# Patient Record
Sex: FEMALE | Race: WHITE | NOT HISPANIC OR LATINO | Employment: FULL TIME | URBAN - METROPOLITAN AREA
[De-identification: names, ages, dates, MRNs, and addresses within clinical notes are randomized per-mention and may not be internally consistent; named-entity substitution may affect disease eponyms.]

---

## 2018-09-17 ENCOUNTER — TELEPHONE (OUTPATIENT)
Dept: FAMILY MEDICINE | Facility: CLINIC | Age: 43
End: 2018-09-17

## 2018-09-17 NOTE — TELEPHONE ENCOUNTER
lmm for pt to call and reschedule missed appt.  Should be Pe when rescheduled.  Do you want me to send no show policy?

## 2018-11-05 ENCOUNTER — TRANSCRIBE ORDERS (OUTPATIENT)
Dept: SCHEDULING | Age: 43
End: 2018-11-05

## 2018-11-05 DIAGNOSIS — R10.2 PELVIC AND PERINEAL PAIN: Primary | ICD-10-CM

## 2018-12-10 ENCOUNTER — TRANSCRIBE ORDERS (OUTPATIENT)
Dept: REGISTRATION | Age: 43
End: 2018-12-10

## 2018-12-10 ENCOUNTER — HOSPITAL ENCOUNTER (OUTPATIENT)
Dept: RADIOLOGY | Age: 43
Discharge: HOME | End: 2018-12-10
Attending: OBSTETRICS & GYNECOLOGY
Payer: COMMERCIAL

## 2018-12-10 DIAGNOSIS — R10.2 PELVIC AND PERINEAL PAIN: ICD-10-CM

## 2018-12-10 PROCEDURE — 76830 TRANSVAGINAL US NON-OB: CPT

## 2019-01-09 ENCOUNTER — OFFICE VISIT (OUTPATIENT)
Dept: FAMILY MEDICINE | Facility: CLINIC | Age: 44
End: 2019-01-09
Payer: COMMERCIAL

## 2019-01-09 VITALS
RESPIRATION RATE: 16 BRPM | HEART RATE: 76 BPM | HEIGHT: 65 IN | SYSTOLIC BLOOD PRESSURE: 108 MMHG | OXYGEN SATURATION: 97 % | WEIGHT: 141 LBS | DIASTOLIC BLOOD PRESSURE: 70 MMHG | BODY MASS INDEX: 23.49 KG/M2

## 2019-01-09 DIAGNOSIS — F07.81 POST CONCUSSION SYNDROME: ICD-10-CM

## 2019-01-09 DIAGNOSIS — Z00.00 WELL WOMAN EXAM WITHOUT GYNECOLOGICAL EXAM: Primary | ICD-10-CM

## 2019-01-09 DIAGNOSIS — E55.9 VITAMIN D DEFICIENCY: ICD-10-CM

## 2019-01-09 PROCEDURE — 99396 PREV VISIT EST AGE 40-64: CPT | Performed by: FAMILY MEDICINE

## 2019-01-09 RX ORDER — CHLORHEXIDINE GLUCONATE ORAL RINSE 1.2 MG/ML
SOLUTION DENTAL
COMMUNITY
Start: 2018-12-23 | End: 2020-06-18

## 2019-01-09 ASSESSMENT — ENCOUNTER SYMPTOMS
FEVER: 0
SLEEP DISTURBANCE: 0
FREQUENCY: 0
UNEXPECTED WEIGHT CHANGE: 0
CONSTIPATION: 0
STRIDOR: 0
FATIGUE: 0
LIGHT-HEADEDNESS: 0
WEAKNESS: 0
SHORTNESS OF BREATH: 0
SEIZURES: 0
ABDOMINAL PAIN: 0
NAUSEA: 0
VOICE CHANGE: 0
WHEEZING: 0
HEADACHES: 1
DYSURIA: 0
CHEST TIGHTNESS: 0
BLOOD IN STOOL: 0
COUGH: 0
ARTHRALGIAS: 0
TREMORS: 0
DIARRHEA: 0
SORE THROAT: 0
PALPITATIONS: 0
DIZZINESS: 0
BACK PAIN: 0
BRUISES/BLEEDS EASILY: 0
CHILLS: 0

## 2019-01-09 NOTE — PATIENT INSTRUCTIONS
Routine advice given on diet/exercise/weight  Recommend monthly self breast exams    Check CT head re residual findings from previous injury

## 2019-01-09 NOTE — PROGRESS NOTES
Subjective      Patient ID: lEise Bean is a 43 y.o. female.    HPI    Generally well  Concerns : concussion 5 years ago - feels dizzy with change of position                            No cognitive difficulty  Following diet - walking for exercise  Recent PAP/ mammo normal    The following have been reviewed and updated as appropriate in this visit:  Problems       Review of Systems   Constitutional: Negative for chills, fatigue, fever and unexpected weight change.   HENT: Negative for ear pain, hearing loss, sore throat, tinnitus and voice change.    Eyes: Positive for visual disturbance (eye exam upcoming).   Respiratory: Negative for cough, chest tightness, shortness of breath, wheezing and stridor.    Cardiovascular: Negative for chest pain, palpitations and leg swelling.   Gastrointestinal: Negative for abdominal pain, blood in stool, constipation, diarrhea and nausea.   Endocrine: Negative for cold intolerance and heat intolerance.   Genitourinary: Negative for dysuria, frequency, pelvic pain, vaginal bleeding and vaginal discharge.   Musculoskeletal: Negative for arthralgias, back pain and gait problem.   Neurological: Positive for headaches. Negative for dizziness, tremors, seizures, syncope, weakness and light-headedness.   Hematological: Does not bruise/bleed easily.   Psychiatric/Behavioral: Negative for sleep disturbance.       Current Outpatient Prescriptions   Medication Sig Dispense Refill   • chlorhexidine (PERIDEX) 0.12 % solution      • cholecalciferol, vitamin D3, (VITAMIN D3) 4,000 unit capsule Take 1 capsule daily w/ largest meal of the day.       No current facility-administered medications for this visit.      No past medical history on file.  Family History   Problem Relation Age of Onset   • Hypertension Mother    • Hypertension Father    • Heart disease Father    • Ovarian cancer Sister    • Ovarian cancer Mother's Sister      Past Surgical History:   Procedure Laterality Date   •  " SECTION       Allergies   Allergen Reactions   • No Known Drug Allergies      Social History     Social History   • Marital status:      Spouse name: N/A   • Number of children: N/A   • Years of education: N/A     Occupational History   • Not on file.     Social History Main Topics   • Smoking status: Former Smoker   • Smokeless tobacco: Never Used   • Alcohol use Not on file   • Drug use: Unknown   • Sexual activity: Not on file     Other Topics Concern   • Not on file     Social History Narrative   • No narrative on file     Vitals:    19 0943 19 1000   BP: 110/60 108/70   BP Location:  Left upper arm   Patient Position:  Sitting   Pulse: 76    Resp: 16    SpO2: 97%    Weight: 64 kg (141 lb)    Height: 1.638 m (5' 4.5\")        Objective     Physical Exam   Constitutional: She is oriented to person, place, and time. She appears well-developed and well-nourished.   HENT:   Head: Normocephalic.   Right Ear: Tympanic membrane normal.   Left Ear: Tympanic membrane normal.   Nose: Nose normal.   Mouth/Throat: Uvula is midline and oropharynx is clear and moist.   Eyes: EOM and lids are normal. Pupils are equal, round, and reactive to light. Lids are everted and swept, no foreign bodies found.   Fundoscopic exam:       The right eye shows no AV nicking.        The left eye shows no AV nicking.   Neck: Carotid bruit is not present. No thyromegaly present.   Cardiovascular: Normal rate, regular rhythm, normal heart sounds, intact distal pulses and normal pulses.    No murmur heard.  Pulmonary/Chest: Effort normal and breath sounds normal.   Abdominal: Soft. Normal appearance, normal aorta and bowel sounds are normal. She exhibits no abdominal bruit. There is no tenderness. There is no CVA tenderness. Hernia confirmed negative in the right inguinal area and confirmed negative in the left inguinal area.   Genitourinary: Vagina normal. Right adnexum displays no mass. Left adnexum displays no mass. "   Lymphadenopathy:     She has no cervical adenopathy.     She has no axillary adenopathy.   Neurological: She is alert and oriented to person, place, and time.   Skin: Skin is warm. No rash noted.   Psychiatric: She has a normal mood and affect. Her speech is normal and behavior is normal. Cognition and memory are normal.       Assessment/Plan   Problem List Items Addressed This Visit     Vitamin D deficiency     Check labs re dosing - benefits vitamin D discussed  Recent labs reviewed and discussed with pt          Relevant Orders    Vitamin D 25 hydroxy      Other Visit Diagnoses     Well woman exam without gynecological exam    -  Primary    Relevant Orders    CBC and Differential    Comprehensive metabolic panel    Lipid panel    TSH 3rd Generation    Post concussion syndrome        Relevant Orders    CT HEAD WITHOUT IV CONTRAST          Elisha Marte-Tawanna, DO  1/10/2019

## 2019-01-10 ENCOUNTER — TELEPHONE (OUTPATIENT)
Dept: FAMILY MEDICINE | Facility: CLINIC | Age: 44
End: 2019-01-10

## 2019-01-19 LAB
25(OH)D3 SERPL-MCNC: 25 NG/ML (ref 30–100)
ALBUMIN SERPL-MCNC: 4.5 G/DL (ref 3.6–5.1)
ALBUMIN/GLOB SERPL: 2 (CALC) (ref 1–2.5)
ALP SERPL-CCNC: 28 U/L (ref 33–115)
ALT SERPL-CCNC: 11 U/L (ref 6–29)
AST SERPL-CCNC: 16 U/L (ref 10–30)
BASOPHILS # BLD AUTO: 51 CELLS/UL (ref 0–200)
BASOPHILS NFR BLD AUTO: 1.3 %
BILIRUB SERPL-MCNC: 0.8 MG/DL (ref 0.2–1.2)
BUN SERPL-MCNC: 11 MG/DL (ref 7–25)
BUN/CREAT SERPL: ABNORMAL (CALC) (ref 6–22)
CALCIUM SERPL-MCNC: 9.4 MG/DL (ref 8.6–10.2)
CHLORIDE SERPL-SCNC: 105 MMOL/L (ref 98–110)
CHOLEST SERPL-MCNC: 219 MG/DL
CHOLEST/HDLC SERPL: 2 (CALC)
CO2 SERPL-SCNC: 29 MMOL/L (ref 20–32)
CREAT SERPL-MCNC: 0.59 MG/DL (ref 0.5–1.1)
EOSINOPHIL # BLD AUTO: 101 CELLS/UL (ref 15–500)
EOSINOPHIL NFR BLD AUTO: 2.6 %
ERYTHROCYTE [DISTWIDTH] IN BLOOD BY AUTOMATED COUNT: 12.3 % (ref 11–15)
GFR SERPL CREATININE-BSD FRML MDRD: 112 ML/MIN/1.73M2
GLOBULIN SER CALC-MCNC: 2.3 G/DL (CALC) (ref 1.9–3.7)
GLUCOSE SERPL-MCNC: 92 MG/DL (ref 65–99)
HCT VFR BLD AUTO: 37.6 % (ref 35–45)
HDLC SERPL-MCNC: 111 MG/DL
HGB BLD-MCNC: 12.9 G/DL (ref 11.7–15.5)
LDLC SERPL CALC-MCNC: 95 MG/DL (CALC)
LYMPHOCYTES # BLD AUTO: 1868 CELLS/UL (ref 850–3900)
LYMPHOCYTES NFR BLD AUTO: 47.9 %
MCH RBC QN AUTO: 30.9 PG (ref 27–33)
MCHC RBC AUTO-ENTMCNC: 34.3 G/DL (ref 32–36)
MCV RBC AUTO: 90.2 FL (ref 80–100)
MONOCYTES # BLD AUTO: 308 CELLS/UL (ref 200–950)
MONOCYTES NFR BLD AUTO: 7.9 %
NEUTROPHILS # BLD AUTO: 1572 CELLS/UL (ref 1500–7800)
NEUTROPHILS NFR BLD AUTO: 40.3 %
NONHDLC SERPL-MCNC: 108 MG/DL (CALC)
PLATELET # BLD AUTO: 219 THOUSAND/UL (ref 140–400)
PMV BLD REES-ECKER: 11.7 FL (ref 7.5–12.5)
POTASSIUM SERPL-SCNC: 4.6 MMOL/L (ref 3.5–5.3)
PROT SERPL-MCNC: 6.8 G/DL (ref 6.1–8.1)
RBC # BLD AUTO: 4.17 MILLION/UL (ref 3.8–5.1)
SODIUM SERPL-SCNC: 141 MMOL/L (ref 135–146)
TRIGL SERPL-MCNC: 44 MG/DL
TSH SERPL-ACNC: 2.74 MIU/L
WBC # BLD AUTO: 3.9 THOUSAND/UL (ref 3.8–10.8)

## 2019-01-21 ENCOUNTER — TELEPHONE (OUTPATIENT)
Dept: FAMILY MEDICINE | Facility: CLINIC | Age: 44
End: 2019-01-21

## 2019-01-21 NOTE — TELEPHONE ENCOUNTER
----- Message from Elisha Romero DO sent at 1/21/2019  6:08 AM EST -----  Call pt -overall labs GREAT - cbc/blood sugar/kidneys/liver/thyroid all normal - [id number] - goal <200/<150/>50/<100-  Am not worried about tchol with such a good HDL  Only issue is  Vit D low at 25 - ? Taking vit D 4000 daily - IF no needs to take -IF yes increase to 8,000 daily   Will be in touch with CT head results

## 2019-01-23 NOTE — TELEPHONE ENCOUNTER
Pt notified of lab results. States she has not been taking Vit D 4000IU. Advised pt resume Rx daily. Pt ROD. She is scheduled for CT head on 1/25/19.

## 2019-01-25 ENCOUNTER — HOSPITAL ENCOUNTER (OUTPATIENT)
Dept: RADIOLOGY | Age: 44
Discharge: HOME | End: 2019-01-25
Attending: FAMILY MEDICINE
Payer: COMMERCIAL

## 2019-01-25 ENCOUNTER — TELEPHONE (OUTPATIENT)
Dept: FAMILY MEDICINE | Facility: CLINIC | Age: 44
End: 2019-01-25

## 2019-01-25 DIAGNOSIS — F07.81 POST CONCUSSION SYNDROME: ICD-10-CM

## 2019-01-25 PROCEDURE — 70450 CT HEAD/BRAIN W/O DYE: CPT

## 2019-12-04 ENCOUNTER — TRANSCRIBE ORDERS (OUTPATIENT)
Dept: SCHEDULING | Age: 44
End: 2019-12-04

## 2019-12-04 DIAGNOSIS — N93.9 ABNORMAL UTERINE AND VAGINAL BLEEDING, UNSPECIFIED: Primary | ICD-10-CM

## 2019-12-30 ENCOUNTER — HOSPITAL ENCOUNTER (OUTPATIENT)
Dept: RADIOLOGY | Age: 44
Discharge: HOME | End: 2019-12-30
Attending: NURSE PRACTITIONER
Payer: COMMERCIAL

## 2019-12-30 DIAGNOSIS — N93.9 ABNORMAL UTERINE AND VAGINAL BLEEDING, UNSPECIFIED: ICD-10-CM

## 2019-12-30 PROCEDURE — 76856 US EXAM PELVIC COMPLETE: CPT | Mod: 59

## 2020-01-24 ENCOUNTER — OFFICE VISIT (OUTPATIENT)
Dept: FAMILY MEDICINE | Facility: CLINIC | Age: 45
End: 2020-01-24
Payer: COMMERCIAL

## 2020-01-24 VITALS
SYSTOLIC BLOOD PRESSURE: 136 MMHG | HEART RATE: 85 BPM | WEIGHT: 146 LBS | OXYGEN SATURATION: 97 % | TEMPERATURE: 99.7 F | DIASTOLIC BLOOD PRESSURE: 90 MMHG | RESPIRATION RATE: 16 BRPM | BODY MASS INDEX: 24.67 KG/M2

## 2020-01-24 DIAGNOSIS — R06.02 SOB (SHORTNESS OF BREATH): ICD-10-CM

## 2020-01-24 DIAGNOSIS — R09.89 CHOKING IN ADULT: Primary | ICD-10-CM

## 2020-01-24 DIAGNOSIS — Z86.39 HISTORY OF THYROIDITIS: ICD-10-CM

## 2020-01-24 DIAGNOSIS — R09.82 PND (POST-NASAL DRIP): ICD-10-CM

## 2020-01-24 PROCEDURE — 93000 ELECTROCARDIOGRAM COMPLETE: CPT | Performed by: NURSE PRACTITIONER

## 2020-01-24 PROCEDURE — 99214 OFFICE O/P EST MOD 30 MIN: CPT | Performed by: NURSE PRACTITIONER

## 2020-01-24 ASSESSMENT — ENCOUNTER SYMPTOMS
UNEXPECTED WEIGHT CHANGE: 0
DIFFICULTY URINATING: 0
VOICE CHANGE: 0
COUGH: 0
SORE THROAT: 0
FATIGUE: 0
PALPITATIONS: 0
FEVER: 0
HEADACHES: 0
CONFUSION: 0
ACTIVITY CHANGE: 0
ABDOMINAL PAIN: 0
WEAKNESS: 0
SPEECH DIFFICULTY: 0
SHORTNESS OF BREATH: 0
ARTHRALGIAS: 0
APPETITE CHANGE: 0
NUMBNESS: 0
TROUBLE SWALLOWING: 0
CHEST TIGHTNESS: 0

## 2020-01-24 NOTE — PROGRESS NOTES
"Subjective      Patient ID: Elise Bean is a 44 y.o. female.  1975      Pt reports one episode of \"choking\" about 6 days ago.  Pt was getting ready for work and felt like she couldn't get air.  Went downstairs to her  and they called EMS.  EMS checked her HR and listened to her lungs and said she was okay.  Pt reports feeling like her throat was sore for about 2 days after the incident.  Pt reports she had coffee and omelet with tomatoes before the episode occurred.  Reports ongoing feeling of post nasal drip in back of throat.  Reports this has never happened before and hasn't happened since this episode.  Reports history of thyroiditis (without explanation) about 6 years ago.    Pt made appt with cardiologist because she is worried something is wrong with her heart, which is in about 3 weeks.  Denies CP, SOB, trouble swallowing, and history of panic attacks.  Denies acid reflux history and heartburn.  Denies fevers/chills.        The following have been reviewed and updated as appropriate in this visit:  Tobacco  Allergies  Meds  Problems  Med Hx  Surg Hx  Fam Hx  Soc Hx        Review of Systems   Constitutional: Negative for activity change, appetite change, fatigue, fever and unexpected weight change.   HENT: Positive for postnasal drip (ongoing). Negative for congestion, sore throat, trouble swallowing and voice change.         One episode of \"choking\" 6 days ago.  Hasn't recurred since.   Eyes: Negative for visual disturbance.   Respiratory: Negative for cough, chest tightness and shortness of breath.    Cardiovascular: Negative for chest pain, palpitations and leg swelling.   Gastrointestinal: Negative for abdominal pain.   Genitourinary: Negative for difficulty urinating.   Musculoskeletal: Negative for arthralgias.   Skin: Negative for rash.   Neurological: Negative for speech difficulty, weakness, numbness and headaches.   Psychiatric/Behavioral: Negative for confusion. "       Objective     Vitals:    01/24/20 0813   BP: 136/90   Pulse: 85   Resp: 16   Temp: 37.6 °C (99.7 °F)   SpO2: 97%   Weight: 66.2 kg (146 lb)     Body mass index is 24.67 kg/m².    Physical Exam   Constitutional: She is oriented to person, place, and time. She appears well-developed and well-nourished. No distress.   HENT:   Head: Normocephalic and atraumatic.   Right Ear: External ear normal.   Left Ear: External ear normal.   Nose: Nose normal.   Mouth/Throat: Oropharynx is clear and moist.   Eyes: Conjunctivae are normal. Right eye exhibits no discharge. Left eye exhibits no discharge. No scleral icterus.   Neck: Normal range of motion and full passive range of motion without pain. No thyromegaly present.   Cardiovascular: Normal rate, regular rhythm and normal heart sounds.   Pulmonary/Chest: Effort normal and breath sounds normal.   Musculoskeletal: Normal range of motion. She exhibits no edema, tenderness or deformity.   Lymphadenopathy:     She has no cervical adenopathy.   Neurological: She is alert and oriented to person, place, and time. No cranial nerve deficit. Coordination normal.   Skin: Skin is warm and dry. No rash noted.   Psychiatric: She has a normal mood and affect. Her behavior is normal. Judgment and thought content normal.   Nursing note and vitals reviewed.      Assessment/Plan   Diagnoses and all orders for this visit:    Choking in adult (Primary)  Suspect r/t GERD (since it occurred after acidic breakfast) or PND.    No red flags on exam or in history.     SOB (shortness of breath)  -     ECG 12 LEAD OFFICE PERFORMED  WNL.  Reassured pt this does not seem to be cardiac etiology.    PND (post-nasal drip)  Advised Nasocort 2 puffs in each nostril once/day.    History of thyroiditis  -     ULTRASOUND THYROID; Future  -     TSH; Future  -     T4, free; Future  -     T3; Future  Thyroid felt WNL on exam, but will check workup d/t history of thyroiditis and choking episode.    Follow up  PRN.

## 2020-01-25 LAB
T3 SERPL-MCNC: 87 NG/DL (ref 76–181)
T4 FREE SERPL-MCNC: 1.1 NG/DL (ref 0.8–1.8)
TSH SERPL-ACNC: 2.06 MIU/L

## 2020-01-27 ENCOUNTER — TELEPHONE (OUTPATIENT)
Dept: FAMILY MEDICINE | Facility: CLINIC | Age: 45
End: 2020-01-27

## 2020-03-02 ENCOUNTER — OFFICE VISIT (OUTPATIENT)
Dept: FAMILY MEDICINE | Facility: CLINIC | Age: 45
End: 2020-03-02
Payer: COMMERCIAL

## 2020-03-02 VITALS
WEIGHT: 146 LBS | SYSTOLIC BLOOD PRESSURE: 104 MMHG | HEART RATE: 98 BPM | BODY MASS INDEX: 24.92 KG/M2 | RESPIRATION RATE: 16 BRPM | DIASTOLIC BLOOD PRESSURE: 79 MMHG | OXYGEN SATURATION: 98 % | TEMPERATURE: 98.2 F | HEIGHT: 64 IN

## 2020-03-02 DIAGNOSIS — I87.2 VENOUS INSUFFICIENCY OF BOTH LOWER EXTREMITIES: ICD-10-CM

## 2020-03-02 DIAGNOSIS — Z00.00 WELL WOMAN EXAM WITHOUT GYNECOLOGICAL EXAM: Primary | ICD-10-CM

## 2020-03-02 DIAGNOSIS — E55.9 VITAMIN D DEFICIENCY: ICD-10-CM

## 2020-03-02 PROCEDURE — 99396 PREV VISIT EST AGE 40-64: CPT | Performed by: FAMILY MEDICINE

## 2020-03-02 SDOH — HEALTH STABILITY: MENTAL HEALTH: HOW OFTEN DO YOU HAVE A DRINK CONTAINING ALCOHOL?: NEVER

## 2020-03-02 ASSESSMENT — ENCOUNTER SYMPTOMS
ARTHRALGIAS: 0
ABDOMINAL PAIN: 0
VOICE CHANGE: 0
SEIZURES: 0
WHEEZING: 0
DIARRHEA: 0
BRUISES/BLEEDS EASILY: 0
CHILLS: 0
DYSURIA: 0
COUGH: 0
LIGHT-HEADEDNESS: 0
UNEXPECTED WEIGHT CHANGE: 0
BLOOD IN STOOL: 0
FATIGUE: 0
SHORTNESS OF BREATH: 0
PALPITATIONS: 1
WEAKNESS: 0
BACK PAIN: 0
SLEEP DISTURBANCE: 0
STRIDOR: 0
HEADACHES: 0
DIZZINESS: 0
CONSTIPATION: 0
CHEST TIGHTNESS: 0
NAUSEA: 0
TREMORS: 0
FREQUENCY: 0
SORE THROAT: 0
FEVER: 0

## 2020-03-02 NOTE — PROGRESS NOTES
Subjective      Patient ID: Elise Bean is a 44 y.o. female.    HPI     Generally well  Following diet - plans more exercise  NO recurrent choking episode    The following have been reviewed and updated as appropriate in this visit:       Review of Systems   Constitutional: Negative for chills, fatigue, fever and unexpected weight change.   HENT: Negative for ear pain, hearing loss, sore throat, tinnitus and voice change.    Eyes: Positive for visual disturbance (less clear vision - eye exam overdue).   Respiratory: Negative for cough, chest tightness, shortness of breath, wheezing and stridor.    Cardiovascular: Positive for palpitations. Negative for chest pain and leg swelling.   Gastrointestinal: Negative for abdominal pain, blood in stool, constipation, diarrhea and nausea.   Endocrine: Negative for cold intolerance and heat intolerance.   Genitourinary: Negative for dysuria, frequency, pelvic pain, vaginal bleeding and vaginal discharge.   Musculoskeletal: Negative for arthralgias, back pain and gait problem.   Neurological: Negative for dizziness, tremors, seizures, syncope, weakness, light-headedness and headaches.   Hematological: Does not bruise/bleed easily.   Psychiatric/Behavioral: Negative for sleep disturbance.       Current Outpatient Medications   Medication Sig Dispense Refill   • chlorhexidine (PERIDEX) 0.12 % solution      • cholecalciferol, vitamin D3, (VITAMIN D3) 4,000 unit capsule Take 1 capsule daily w/ largest meal of the day.       No current facility-administered medications for this visit.      History reviewed. No pertinent past medical history.  Family History   Problem Relation Age of Onset   • Hypertension Biological Mother    • Hypertension Biological Father    • Heart disease Biological Father    • Diabetes Biological Father    • Ovarian cancer Biological Sister    • Ovarian cancer Mother's Sister      Past Surgical History:   Procedure Laterality Date   •  SECTION    "    Allergies   Allergen Reactions   • No Known Drug Allergies      Social History     Socioeconomic History   • Marital status:      Spouse name: Not on file   • Number of children: Not on file   • Years of education: Not on file   • Highest education level: Not on file   Occupational History   • Not on file   Social Needs   • Financial resource strain: Not on file   • Food insecurity:     Worry: Not on file     Inability: Not on file   • Transportation needs:     Medical: Not on file     Non-medical: Not on file   Tobacco Use   • Smoking status: Former Smoker   • Smokeless tobacco: Never Used   Substance and Sexual Activity   • Alcohol use: Never     Frequency: Never   • Drug use: Never   • Sexual activity: Yes     Partners: Male   Lifestyle   • Physical activity:     Days per week: Not on file     Minutes per session: Not on file   • Stress: Not on file   Relationships   • Social connections:     Talks on phone: Not on file     Gets together: Not on file     Attends [employer] service: Not on file     Active member of club or organization: Not on file     Attends meetings of clubs or organizations: Not on file     Relationship status: Not on file   • Intimate partner violence:     Fear of current or ex partner: Not on file     Emotionally abused: Not on file     Physically abused: Not on file     Forced sexual activity: Not on file   Other Topics Concern   • Not on file   Social History Narrative   • Not on file     Vitals:    03/02/20 1049 03/02/20 1122   BP: 110/60 104/79   BP Location: Right upper arm    Patient Position: Sitting    Pulse: 98    Resp: 16    Temp: 36.8 °C (98.2 °F)    TempSrc: Temporal    SpO2: 98%    Weight: 66.2 kg (146 lb)    Height: 1.626 m (5' 4\")        Objective     Physical Exam   Constitutional: She is oriented to person, place, and time. She appears well-developed and well-nourished.   HENT:   Head: Normocephalic.   Right Ear: Tympanic membrane normal.   Left Ear: Tympanic " membrane normal.   Nose: Nose normal.   Mouth/Throat: Uvula is midline, oropharynx is clear and moist and mucous membranes are normal. No posterior oropharyngeal erythema.   Eyes: Pupils are equal, round, and reactive to light. EOM and lids are normal. Lids are everted and swept, no foreign bodies found.   Fundoscopic exam:       The right eye shows no AV nicking.        The left eye shows no AV nicking.   Neck: Carotid bruit is not present. No thyromegaly present.   Cardiovascular: Normal rate, regular rhythm, normal heart sounds, intact distal pulses and normal pulses.   No murmur heard.  Mod dilated superficial veins lower extrem bilat   Pulmonary/Chest: Effort normal and breath sounds normal.   Abdominal: Soft. Normal appearance, normal aorta and bowel sounds are normal. She exhibits no abdominal bruit. There is no hepatosplenomegaly. There is no tenderness. There is no CVA tenderness. Hernia confirmed negative in the right inguinal area and confirmed negative in the left inguinal area.   Genitourinary: Vagina normal. Right adnexum displays no mass. Left adnexum displays no mass.   Lymphadenopathy:     She has no cervical adenopathy.     She has no axillary adenopathy.   Neurological: She is alert and oriented to person, place, and time.   Skin: Skin is warm. No rash noted.   Psychiatric: She has a normal mood and affect. Her speech is normal and behavior is normal. Cognition and memory are normal.       Assessment/Plan   Problem List Items Addressed This Visit        Circulatory    Venous insufficiency of both lower extremities     Support stockings            Digestive    Vitamin D deficiency     Check labs re dosing - benefits vitamin D discussed  Recent labs reviewed and discussed with pt          Relevant Orders    Vitamin D 25 hydroxy      Other Visit Diagnoses     Well woman exam without gynecological exam    -  Primary    Relevant Orders    CBC and Differential    Comprehensive metabolic panel    Lipid  panel          Elisha Romero,   3/3/2020

## 2020-03-02 NOTE — PATIENT INSTRUCTIONS
FAX LABS TO CARDIOLOGY    Routine advice given on diet/exercise/weight  Recommend monthly self breast exams  Recommend calcium with D3 - VIT D DAILY

## 2020-03-07 LAB
25(OH)D3 SERPL-MCNC: 26 NG/ML (ref 30–100)
ALBUMIN SERPL-MCNC: 4.1 G/DL (ref 3.6–5.1)
ALBUMIN/GLOB SERPL: 1.7 (CALC) (ref 1–2.5)
ALP SERPL-CCNC: 29 U/L (ref 31–125)
ALT SERPL-CCNC: 6 U/L (ref 6–29)
AST SERPL-CCNC: 12 U/L (ref 10–30)
BASOPHILS # BLD AUTO: 52 CELLS/UL (ref 0–200)
BASOPHILS NFR BLD AUTO: 1.2 %
BILIRUB SERPL-MCNC: 0.9 MG/DL (ref 0.2–1.2)
BUN SERPL-MCNC: 10 MG/DL (ref 7–25)
BUN/CREAT SERPL: ABNORMAL (CALC) (ref 6–22)
CALCIUM SERPL-MCNC: 9.1 MG/DL (ref 8.6–10.2)
CHLORIDE SERPL-SCNC: 103 MMOL/L (ref 98–110)
CHOLEST SERPL-MCNC: 192 MG/DL
CHOLEST/HDLC SERPL: 2.1 (CALC)
CO2 SERPL-SCNC: 28 MMOL/L (ref 20–32)
CREAT SERPL-MCNC: 0.61 MG/DL (ref 0.5–1.1)
EOSINOPHIL # BLD AUTO: 90 CELLS/UL (ref 15–500)
EOSINOPHIL NFR BLD AUTO: 2.1 %
ERYTHROCYTE [DISTWIDTH] IN BLOOD BY AUTOMATED COUNT: 13 % (ref 11–15)
GLOBULIN SER CALC-MCNC: 2.4 G/DL (CALC) (ref 1.9–3.7)
GLUCOSE SERPL-MCNC: 87 MG/DL (ref 65–99)
HCT VFR BLD AUTO: 37.2 % (ref 35–45)
HDLC SERPL-MCNC: 92 MG/DL
HGB BLD-MCNC: 12.7 G/DL (ref 11.7–15.5)
LDLC SERPL CALC-MCNC: 87 MG/DL (CALC)
LYMPHOCYTES # BLD AUTO: 1746 CELLS/UL (ref 850–3900)
LYMPHOCYTES NFR BLD AUTO: 40.6 %
MCH RBC QN AUTO: 30.2 PG (ref 27–33)
MCHC RBC AUTO-ENTMCNC: 34.1 G/DL (ref 32–36)
MCV RBC AUTO: 88.6 FL (ref 80–100)
MONOCYTES # BLD AUTO: 310 CELLS/UL (ref 200–950)
MONOCYTES NFR BLD AUTO: 7.2 %
NEUTROPHILS # BLD AUTO: 2103 CELLS/UL (ref 1500–7800)
NEUTROPHILS NFR BLD AUTO: 48.9 %
NONHDLC SERPL-MCNC: 100 MG/DL (CALC)
PLATELET # BLD AUTO: 213 THOUSAND/UL (ref 140–400)
PMV BLD REES-ECKER: 12.1 FL (ref 7.5–12.5)
POTASSIUM SERPL-SCNC: 4.1 MMOL/L (ref 3.5–5.3)
PROT SERPL-MCNC: 6.5 G/DL (ref 6.1–8.1)
QUEST EGFR NON-AFR. AMERICAN: 110 ML/MIN/1.73M2
RBC # BLD AUTO: 4.2 MILLION/UL (ref 3.8–5.1)
SODIUM SERPL-SCNC: 138 MMOL/L (ref 135–146)
TRIGL SERPL-MCNC: 50 MG/DL
WBC # BLD AUTO: 4.3 THOUSAND/UL (ref 3.8–10.8)

## 2020-03-09 ENCOUNTER — TELEPHONE (OUTPATIENT)
Dept: FAMILY MEDICINE | Facility: CLINIC | Age: 45
End: 2020-03-09

## 2020-03-09 NOTE — TELEPHONE ENCOUNTER
----- Message from Elisha Romero DO sent at 3/9/2020  6:40 AM EDT -----  CALL PT - CBC/BLOOD SUGAR/KIDNEYS/LIVER ALL NORMAL  CHOLESTEROL PANEL EXCELLENT   VITAMIN D LOW AT 26 - GOAL > 40  RECOMMEND VIT D3  4000 UNITS DAILY

## 2020-05-05 ENCOUNTER — TELEMEDICINE (OUTPATIENT)
Dept: FAMILY MEDICINE | Facility: CLINIC | Age: 45
End: 2020-05-05
Payer: COMMERCIAL

## 2020-05-05 DIAGNOSIS — R03.0 BLOOD PRESSURE ELEVATED WITHOUT HISTORY OF HTN: ICD-10-CM

## 2020-05-05 DIAGNOSIS — B00.1 RECURRENT COLD SORES: ICD-10-CM

## 2020-05-05 DIAGNOSIS — I80.02 SUPERFICIAL PHLEBITIS OF LEFT LEG: Primary | ICD-10-CM

## 2020-05-05 PROCEDURE — 99214 OFFICE O/P EST MOD 30 MIN: CPT | Mod: 95 | Performed by: FAMILY MEDICINE

## 2020-05-05 RX ORDER — VALACYCLOVIR HYDROCHLORIDE 500 MG/1
500 TABLET, FILM COATED ORAL 2 TIMES DAILY
Qty: 10 TABLET | Refills: 1 | Status: SHIPPED | OUTPATIENT
Start: 2020-05-05 | End: 2020-06-18

## 2020-05-05 NOTE — PROGRESS NOTES
Verification of Patient Location:  The patient affirms they are currently located in the following state: [place]    Request for Consent:   Video Encounter   Margarito, my name is Elisha TIMUR DO Nick.  Before we proceed, can you please verify your identification by telling me your full name and date of birth?  Can you tell me who is in the room with you?    You and I are about to have a telemedicine check-in or visit because you have requested it.  This is a live video-conference.  I am a real person, speaking to you in real time.  There is no one else with me on the video-conference.  However, when we use ([employer], [employer], etc) it is important for you to know that the video-conference may not be secure or private.  I am not recording this conversation and I am asking you not to record it.  This telemedicine visit will be billed to your health insurance or you, if you are self-insured.  You understand you will be responsible for any copayments or coinsurances that apply to your telemedicine visit.  Before starting our telemedicine visit, I am required to get your consent for this virtual check-in or visit by telemedicine. Do you consent?    Patient Response to Request for Consent:  Yes    This telemedicine visit is being performed due to the national COVID emeegency  COVID counseling given      Visit Documentation:  Subjective     Patient ID: Elise Bean is a 44 y.o. female.  1975      HPI    Pt with multiple complaints  * chest pressure anterior with radiation to left shoulder         Had telemedicine visit with cardiologist this am - echo/holter recommended  * longstanding venous insuff - seen previously vein clinics - vein stripping - still with pain left calf - no swelling   Worried about blood clot   * checking BP at home and elevated as below - eating sig salt in diet  BP  144/83, HR 90         137/82        80          135/91       77    Mother HPT/ chol  Father  HPT./ DM  The  following have been reviewed and updated as appropriate in this visit:       Review of Systems   Constitutional: Negative for chills, fatigue and unexpected weight change.   Eyes: Negative for visual disturbance.   Respiratory: Positive for chest tightness. Negative for cough and shortness of breath.    Cardiovascular: Negative for chest pain, palpitations and leg swelling.   Gastrointestinal: Negative for abdominal pain, blood in stool, constipation, diarrhea and nausea.   Endocrine: Negative for cold intolerance and heat intolerance.   Genitourinary: Negative for dysuria, frequency, hematuria and urgency.   Musculoskeletal: Negative for arthralgias.   Skin: Positive for rash (cold sores lip - recurrent/ not frequent). Negative for color change.   Neurological: Negative for dizziness, numbness and headaches.   Hematological: Negative for adenopathy. Does not bruise/bleed easily.   Psychiatric/Behavioral: Negative for sleep disturbance. The patient is nervous/anxious.        Previous labs discussed - lipids at goal    Assessment/Plan     Diagnoses and all orders for this visit:    Superficial phlebitis of left leg  Labs for hypercoagulability ordered at pt request  Counseled re signs DVT  Counseled likely need additional vein procedure for SX  -     D-DIMER; Future  -     Protein S activity; Future  -     Protein C activity; Future  -     Factor 5 Leiden; Future  -     D-DIMER  -     Protein S activity  -     Protein C activity  -     Factor 5 Leiden    Recurrent cold sores  meds as directed   Blood pressure elevated without history of HTN  Risk factors for hypertension discussed  Strong family HX   Low salt diet - continue to monitor BP   Counseled may need RX  Other orders  -     valACYclovir (VALTREX) 500 mg tablet; Take 1 tablet (500 mg total) by mouth 2 (two) times a day for 5 days.      Time Spent in Medical Discussion During This Encounter:      25 minutes

## 2020-05-06 ASSESSMENT — ENCOUNTER SYMPTOMS
NUMBNESS: 0
CONSTIPATION: 0
UNEXPECTED WEIGHT CHANGE: 0
ARTHRALGIAS: 0
HEMATURIA: 0
ADENOPATHY: 0
SHORTNESS OF BREATH: 0
COUGH: 0
FATIGUE: 0
PALPITATIONS: 0
BRUISES/BLEEDS EASILY: 0
DIZZINESS: 0
NERVOUS/ANXIOUS: 1
ABDOMINAL PAIN: 0
COLOR CHANGE: 0
BLOOD IN STOOL: 0
HEADACHES: 0
FREQUENCY: 0
SLEEP DISTURBANCE: 0
DIARRHEA: 0
CHEST TIGHTNESS: 1
NAUSEA: 0
CHILLS: 0
DYSURIA: 0

## 2020-05-06 NOTE — PATIENT INSTRUCTIONS
CHECK LABS RE VEIN INFLAMMATION   COUNSELED LIKELY NEED ADDITIONAL PROCEDURE DUE TO CALF   LIMIT SALT  CLOSE FOLLOW BP AT HOME

## 2020-05-08 ENCOUNTER — TELEMEDICINE (OUTPATIENT)
Dept: FAMILY MEDICINE | Facility: CLINIC | Age: 45
End: 2020-05-08
Payer: COMMERCIAL

## 2020-05-08 DIAGNOSIS — F41.1 GAD (GENERALIZED ANXIETY DISORDER): ICD-10-CM

## 2020-05-08 DIAGNOSIS — R00.2 PALPITATIONS: Primary | ICD-10-CM

## 2020-05-08 PROCEDURE — 99213 OFFICE O/P EST LOW 20 MIN: CPT | Mod: 95 | Performed by: FAMILY MEDICINE

## 2020-05-08 RX ORDER — ALPRAZOLAM 0.25 MG/1
0.25 TABLET ORAL NIGHTLY PRN
Qty: 10 TABLET | Refills: 0 | Status: SHIPPED | OUTPATIENT
Start: 2020-05-08 | End: 2020-06-18

## 2020-05-08 ASSESSMENT — ENCOUNTER SYMPTOMS
COUGH: 0
FEVER: 0
PALPITATIONS: 1
NERVOUS/ANXIOUS: 1
CHILLS: 0
DIZZINESS: 0
FATIGUE: 0
SLEEP DISTURBANCE: 1
HEADACHES: 1
SHORTNESS OF BREATH: 0

## 2020-05-08 NOTE — PROGRESS NOTES
Verification of Patient Location:  The patient affirms they are currently located in the following state: [place]    Request for Consent:   Video Encounter   Margarito, my name is Elisha DING DO Nick.  Before we proceed, can you please verify your identification by telling me your full name and date of birth?  Can you tell me who is in the room with you?    You and I are about to have a telemedicine check-in or visit because you have requested it.  This is a live video-conference.  I am a real person, speaking to you in real time.  There is no one else with me on the video-conference.  However, when we use ([employer], [employer], etc) it is important for you to know that the video-conference may not be secure or private.  I am not recording this conversation and I am asking you not to record it.  This telemedicine visit will be billed to your health insurance or you, if you are self-insured.  You understand you will be responsible for any copayments or coinsurances that apply to your telemedicine visit.  Before starting our telemedicine visit, I am required to get your consent for this virtual check-in or visit by telemedicine. Do you consent?    Patient Response to Request for Consent:  Yes     This telemeicine visit is being performed due to the national COVID emergency per CDC guidelines    Visit Documentation:  Subjective     Patient ID: Elise Bean is a 44 y.o. female.  1975      HPI    Pt c/o difficulty sleeping since palpitations began   pt seen in ER with sx- afraid something wrong with heart   work up in progress including echo/holter/ hypercoagulability  Asking for help with sleep difficulty    /83   HR 90    The following have been reviewed and updated as appropriate in this visit:       Review of Systems   Constitutional: Negative for chills, fatigue and fever.   Eyes: Negative for visual disturbance.   Respiratory: Negative for cough and shortness of breath.    Cardiovascular:  Positive for palpitations. Negative for chest pain and leg swelling.   Neurological: Positive for headaches (neck to top of head). Negative for dizziness.   Psychiatric/Behavioral: Positive for sleep disturbance. The patient is nervous/anxious.      ALL LABS/ TESTING [place] [facility] REVIEWED AND DISCUSSED WITH PT    Assessment/Plan   Diagnoses and all orders for this visit:    Palpitations  Comments:  echo/holter as planned    MICHELLE (generalized anxiety disorder)  Comments:  counseled re effect of stress on BP / HR/ sleep  rec natural RX but use of prescription if needed    PMPAWARxE website queried no abnormalities noted.  Patient is only receiving controlled substances from my office  Prescription sent electronically      Time Spent in Medical Discussion During This Encounter:      15 minutes

## 2020-05-17 LAB
D DIMER PPP FEU-MCNC: 0.24 MCG/ML FEU
F5 GENE MUT ANL BLD/T: NORMAL
F5 P.R506Q BLD/T QL: NORMAL
PROT C ACT/NOR PPP: 100 % (ref 70–180)
PROT S ACT/NOR PPP: 70 % (ref 60–140)

## 2020-05-28 ENCOUNTER — TELEPHONE (OUTPATIENT)
Dept: FAMILY MEDICINE | Facility: CLINIC | Age: 45
End: 2020-05-28

## 2020-06-01 RX ORDER — CITALOPRAM 10 MG/1
10 TABLET ORAL DAILY
Qty: 30 TABLET | Refills: 1 | Status: SHIPPED | OUTPATIENT
Start: 2020-06-01 | End: 2020-06-18

## 2020-06-01 NOTE — TELEPHONE ENCOUNTER
Discussed labs with pt   Hetero Factor V leiden - start ASA gr 81  Pt continues with anxiety     Father passed away -no sleeping   Start SSRI

## 2020-06-18 ENCOUNTER — OFFICE VISIT (OUTPATIENT)
Dept: FAMILY MEDICINE | Facility: CLINIC | Age: 45
End: 2020-06-18
Payer: COMMERCIAL

## 2020-06-18 VITALS
RESPIRATION RATE: 16 BRPM | DIASTOLIC BLOOD PRESSURE: 70 MMHG | TEMPERATURE: 97.6 F | BODY MASS INDEX: 23.56 KG/M2 | OXYGEN SATURATION: 97 % | HEIGHT: 64 IN | SYSTOLIC BLOOD PRESSURE: 122 MMHG | HEART RATE: 96 BPM | WEIGHT: 138 LBS

## 2020-06-18 DIAGNOSIS — F51.01 PRIMARY INSOMNIA: Primary | ICD-10-CM

## 2020-06-18 DIAGNOSIS — F41.1 GAD (GENERALIZED ANXIETY DISORDER): ICD-10-CM

## 2020-06-18 PROCEDURE — 99213 OFFICE O/P EST LOW 20 MIN: CPT | Performed by: FAMILY MEDICINE

## 2020-06-18 RX ORDER — SERTRALINE HYDROCHLORIDE 25 MG/1
25 TABLET, FILM COATED ORAL DAILY
Qty: 30 TABLET | Refills: 3 | Status: SHIPPED | OUTPATIENT
Start: 2020-06-18 | End: 2021-03-08

## 2020-06-18 NOTE — PROGRESS NOTES
Subjective      Patient ID: Elise Bean is a 44 y.o. female.    HPI     Heart racing - very anxious - NOT sleeping x months   seen by cardiology - DID NOT have holter as ordered  possible RXN citalopram - nausea / funny thoughts -    Only took 1 dose  Tried OTC aid - benadryl last PM - did sleep several hours      The following have been reviewed and updated as appropriate in this visit:       Review of Systems   Constitutional: Positive for fatigue. Negative for chills, diaphoresis and fever.   Respiratory: Negative for shortness of breath.    Cardiovascular: Positive for palpitations. Negative for chest pain and leg swelling.   Psychiatric/Behavioral: Positive for dysphoric mood and sleep disturbance. The patient is nervous/anxious.        Current Outpatient Medications   Medication Sig Dispense Refill   • cholecalciferol, vitamin D3, (VITAMIN D3) 4,000 unit capsule Take 1 capsule daily w/ largest meal of the day.     • sertraline (ZOLOFT) 25 mg tablet Take 1 tablet (25 mg total) by mouth daily. 30 tablet 3     No current facility-administered medications for this visit.      No past medical history on file.  Family History   Problem Relation Age of Onset   • Hypertension Biological Mother    • Hypertension Biological Father    • Heart disease Biological Father    • Diabetes Biological Father    • Ovarian cancer Biological Sister    • Ovarian cancer Mother's Sister      Past Surgical History:   Procedure Laterality Date   •  SECTION       Allergies   Allergen Reactions   • No Known Drug Allergies      Social History     Socioeconomic History   • Marital status:      Spouse name: Not on file   • Number of children: Not on file   • Years of education: Not on file   • Highest education level: Not on file   Occupational History   • Not on file   Social Needs   • Financial resource strain: Not on file   • Food insecurity:     Worry: Not on file     Inability: Not on file   • Transportation needs:      "Medical: Not on file     Non-medical: Not on file   Tobacco Use   • Smoking status: Former Smoker   • Smokeless tobacco: Never Used   Substance and Sexual Activity   • Alcohol use: Never     Frequency: Never   • Drug use: Never   • Sexual activity: Yes     Partners: Male   Lifestyle   • Physical activity:     Days per week: Not on file     Minutes per session: Not on file   • Stress: Not on file   Relationships   • Social connections:     Talks on phone: Not on file     Gets together: Not on file     Attends Scientology service: Not on file     Active member of club or organization: Not on file     Attends meetings of clubs or organizations: Not on file     Relationship status: Not on file   • Intimate partner violence:     Fear of current or ex partner: Not on file     Emotionally abused: Not on file     Physically abused: Not on file     Forced sexual activity: Not on file   Other Topics Concern   • Not on file   Social History Narrative   • Not on file     Vitals:    06/18/20 0814 06/18/20 0828   BP: 122/70 122/70   BP Location:  Left upper arm   Patient Position:  Sitting   Pulse: 96    Resp: 16    Temp: 36.4 °C (97.6 °F)    SpO2: 97%    Weight: 62.6 kg (138 lb)    Height: 1.626 m (5' 4\")        Objective     Physical Exam   Constitutional: She appears well-developed and well-nourished. No distress.   Cardiovascular: Normal rate, regular rhythm, intact distal pulses and normal pulses.   Pulmonary/Chest: Effort normal and breath sounds normal.   Abdominal: Soft. Normal appearance and bowel sounds are normal. There is no hepatosplenomegaly. There is no tenderness.   Psychiatric: Her speech is normal and behavior is normal. Her mood appears anxious. Cognition and memory are normal.       Assessment/Plan   Problem List Items Addressed This Visit        Other    Primary insomnia - Primary    MICHELLE (generalized anxiety disorder)     Counseled re chemical imbalance associated with anxiety  meds as directed  Possible side " effects discussed            Relevant Medications    sertraline (ZOLOFT) 25 mg tablet          Elisha Romero DO  6/20/2020

## 2020-06-20 PROBLEM — F51.01 PRIMARY INSOMNIA: Status: ACTIVE | Noted: 2020-06-20

## 2020-06-20 PROBLEM — F41.1 GAD (GENERALIZED ANXIETY DISORDER): Status: ACTIVE | Noted: 2020-06-20

## 2020-06-20 ASSESSMENT — ENCOUNTER SYMPTOMS
DIAPHORESIS: 0
NERVOUS/ANXIOUS: 1
FEVER: 0
DYSPHORIC MOOD: 1
CHILLS: 0
PALPITATIONS: 1
SHORTNESS OF BREATH: 0
SLEEP DISTURBANCE: 1
FATIGUE: 1

## 2020-06-20 NOTE — ASSESSMENT & PLAN NOTE
Counseled re chemical imbalance associated with anxiety  meds as directed  Possible side effects discussed

## 2020-08-10 ENCOUNTER — TELEPHONE (OUTPATIENT)
Dept: FAMILY MEDICINE | Facility: CLINIC | Age: 45
End: 2020-08-10

## 2020-08-10 NOTE — TELEPHONE ENCOUNTER
8/10/20 Pt called in regards to a lab that her insurance didn't cover. She said she is fully covered. She spoke to billing and they said the code was put in wrong. She said the test Coag Fact V ERNESTO BARNES 81.520 she asked for a call back when this has been resolved. [phone] Thanks

## 2020-12-14 ENCOUNTER — TELEPHONE (OUTPATIENT)
Dept: FAMILY MEDICINE | Facility: CLINIC | Age: 45
End: 2020-12-14

## 2020-12-14 PROBLEM — D68.51 HETEROZYGOUS FACTOR V LEIDEN MUTATION (CMS/HCC): Status: ACTIVE | Noted: 2020-12-14

## 2021-01-07 ENCOUNTER — TELEPHONE (OUTPATIENT)
Dept: FAMILY MEDICINE | Facility: CLINIC | Age: 46
End: 2021-01-07

## 2021-01-07 NOTE — TELEPHONE ENCOUNTER
PLEASE CALL PT AND LET HER KNOW WHAT WE HAVE BEEN DOING RE RESUBMITTING CORRECT CODE / STILL DENYING  I CAN WRITE A LETTER

## 2021-01-07 NOTE — TELEPHONE ENCOUNTER
Pt called in regards to a lab that her insurance didn't cover on 05/11. She said she is fully covered. She spoke to billing and they said the code was put in wrong. She said she dropped the bill off twice and nothing was resolved. She was told that you would resubmit with new code. She said that she is now getting letters in the mail that she needs to pay her balance immediately .

## 2021-01-22 ENCOUNTER — OFFICE VISIT (OUTPATIENT)
Dept: FAMILY MEDICINE | Facility: CLINIC | Age: 46
End: 2021-01-22
Payer: COMMERCIAL

## 2021-01-22 VITALS
WEIGHT: 141 LBS | DIASTOLIC BLOOD PRESSURE: 74 MMHG | HEIGHT: 64 IN | SYSTOLIC BLOOD PRESSURE: 114 MMHG | OXYGEN SATURATION: 99 % | HEART RATE: 80 BPM | BODY MASS INDEX: 24.07 KG/M2 | TEMPERATURE: 97.4 F

## 2021-01-22 DIAGNOSIS — J38.3 PARADOXICAL VOCAL CORD MOTION: Primary | ICD-10-CM

## 2021-01-22 PROCEDURE — 99213 OFFICE O/P EST LOW 20 MIN: CPT | Performed by: FAMILY MEDICINE

## 2021-01-22 ASSESSMENT — ENCOUNTER SYMPTOMS
ABDOMINAL PAIN: 0
CONFUSION: 0
NAUSEA: 0
FEVER: 0
CHILLS: 0
JOINT SWELLING: 0
DYSURIA: 0
WOUND: 0
VOMITING: 0
SHORTNESS OF BREATH: 0
EYE DISCHARGE: 0
NUMBNESS: 0
WEAKNESS: 0
HALLUCINATIONS: 0
HEMATURIA: 0
COUGH: 0
RHINORRHEA: 0

## 2021-01-22 NOTE — TELEPHONE ENCOUNTER
Patient would like you to write a letter to try to get the Factor V covered.  Please let her know the status.     Thank you

## 2021-01-22 NOTE — PROGRESS NOTES
"  Subjective      Patient ID: Elise Bean is a 45 y.o. female.  1975      Elise Bean presents with choking episode. She states she was sitting upright at the time, not actively eating or drinking at the time. She states she all of a sudden couldn't breath, states she was wheezing trying to get her breath. Her mother in law patted her back and the episode sporadically resolved. She had one similar episode in the past last January.     Patient also asking if we can order \"life test\" for her. She recently saw cardiology, Dr dickson with [facility] and was advised to have \"life test\", some sort of vascular test? Last note from visit 12/15/20 reviewed and does not mention this. It only states she may have venous insufficiency and he will address at next visit in 6 months and potentially order vascular studies at that time.    She also questions if she still needs the thyroid ultrasound that was ordered last January that she never had done. Discussed that today's visit is only a problem visit and and these other topics should be addressed with cardiology and her usual pcp respectively.      The following have been reviewed and updated as appropriate in this visit:  Allergies  Meds  Problems       Review of Systems   Constitutional: Negative for chills and fever.   HENT: Negative for congestion and rhinorrhea.    Eyes: Negative for discharge and visual disturbance.   Respiratory: Negative for cough and shortness of breath.    Cardiovascular: Negative for chest pain and leg swelling.   Gastrointestinal: Negative for abdominal pain, nausea and vomiting.   Genitourinary: Negative for dysuria and hematuria.   Musculoskeletal: Negative for gait problem and joint swelling.   Skin: Negative for rash and wound.   Neurological: Negative for weakness and numbness.   Psychiatric/Behavioral: Negative for confusion and hallucinations.   All other systems reviewed and are negative.      Objective     Vitals:    01/22/21 1137 " "  BP: 114/74   BP Location: Left upper arm   Patient Position: Sitting   Pulse: 80   Temp: 36.3 °C (97.4 °F)   TempSrc: Temporal   SpO2: 99%   Weight: 64 kg (141 lb)   Height: 1.626 m (5' 4.02\")     Body mass index is 24.19 kg/m².    Physical Exam  Vitals signs and nursing note reviewed.   Constitutional:       Appearance: She is well-developed.   HENT:      Head: Normocephalic and atraumatic.   Eyes:      Conjunctiva/sclera: Conjunctivae normal.   Neck:      Musculoskeletal: Normal range of motion and neck supple.   Cardiovascular:      Rate and Rhythm: Normal rate and regular rhythm.      Heart sounds: Normal heart sounds.   Pulmonary:      Effort: Pulmonary effort is normal.      Breath sounds: Normal breath sounds.   Abdominal:      General: Bowel sounds are normal.      Palpations: Abdomen is soft.   Musculoskeletal: Normal range of motion.   Skin:     General: Skin is warm and dry.      Capillary Refill: Capillary refill takes less than 2 seconds.   Neurological:      Mental Status: She is alert and oriented to person, place, and time.   Psychiatric:         Behavior: Behavior normal.         Assessment/Plan   Diagnoses and all orders for this visit:    Paradoxical vocal cord motion (Primary)    discussed with patient that this is not likely an allergic reaction or related to her remote history of thyroiditis given the sudden onset and brief duration of symptoms. Discussed that diagnosis is clinical and can only be proven if she has NPL while actively symptomatic. Handout given regarding probably diagnosis.     Return if symptoms worsen or fail to improve, for Next scheduled follow-up.    Marina Ardon,        "

## 2021-01-22 NOTE — Clinical Note
Patient wants to know if she still needs thyroid ultrasound that Kristine ordered Jan 2020 that she never got.

## 2021-01-22 NOTE — PATIENT INSTRUCTIONS
Patient Education     Paradoxical Vocal Fold Motion    Paradoxical vocal fold motion is a condition that causes shortness of breath and noisy breathing (stridor). It may also be called vocal cord dysfunction. Normally, the vocal cords (vocal folds) inside the voice box (larynx) open when you breathe in and out. If you have paradoxical vocal fold motion, your vocal cords close when you try to breathe. This makes breathing difficult.  This condition can be scary, but it is usually not dangerous. It can be confused with asthma because the symptoms are similar.  What are the causes?  Paradoxical vocal fold motion is caused by overreaction (hyperexcitability) of the nerves that provide movement and feeling in the vocal cords. The cause of hyperexcitability is not known, but sometimes triggers can be identified. Common triggers of vocal cord hyperexcitability include:  · Stomach acid flowing up into your throat (gastric reflux).  · Mucus going down the back of your throat (postnasal drip).  · Infection in the nose, mouth, throat, or larynx (upper respiratory tract infection).  · Exercise.  · Strong smells.  · Cigarette smoke or other irritating substances in the air.  · Stress.  · Allergies.  What are the signs or symptoms?  Symptoms of this condition include:  · Shortness of breath. This may happen at rest or during exercise.  · Frequent coughing and clearing the throat.  · A feeling of choking or tightness in the throat.  · Stridor or wheezing.  · Hoarse voice.  Symptoms may come and go. They do not occur during sleep. They may range from mild to severe.  How is this diagnosed?  This condition can be hard to diagnose because it comes and goes and is similar to asthma. Your health care provider may suspect paradoxical vocal fold motion if you do not have symptoms while you sleep or if asthma medicines do not relieve your symptoms. Your health care provider may:  · Have you work with (refer you to) an ears, nose, and  throat specialist (otolaryngologist, ENT) or a speech and language specialist (speech-language pathologist) who will examine your vocal cords to see if they close when you breathe. This examination is usually done by inserting a thin, flexible tube with a light and camera on the end of it through your nose to look down into your larynx (laryngoscopy). This is the most reliable way to diagnose the condition.  · Refer you to a breathing specialist (pulmonologist) who will do breathing tests (spirometry or pulmonary function tests, PFTs) to check for a breathing pattern that is typical for this condition.  · Do a physical exam.  · Ask about your symptoms and what seems to cause them (your triggers).  · Listen to your breathing.  · Test your blood for:  ? Oxygen levels.  ? Allergies.  · Do a chest X-ray.  How is this treated?  This condition is treated with speech or voice therapy, which includes breathing and relaxation exercises to help you learn how to relax and control your vocal cords. After you learn those exercises, you can practice them at home and use them to stop an attack. Talk therapy (psychotherapy) with a psychologist can help you learn ways to reduce stress and anxiety and avoid triggers.  In some cases, your health care provider may recommend medicines to treat an underlying condition that can trigger paradoxical vocal fold motion. These may include medicines for:  · Postnasal drip.  · Gastric reflux.  · Upper respiratory tract infection.  · Allergies.  · Stress.  Follow these instructions at home:  · Take over-the-counter and prescription medicines only as told by your health care provider.  · Follow instructions from your speech or voice therapist about practicing your vocal cord relaxation and breathing exercises at home.  · Follow instructions from your psychologist about how to lower your stress and anxiety.  · Return to your normal activities as told by your health care provider. Ask your health  care provider what activities are safe for you.  · Avoid triggers.  · Do not use any products that contain nicotine or tobacco, such as cigarettes and e-cigarettes. If you need help quitting, ask your health care provider. Cigarette smoke may trigger this condition.  · Keep all follow-up visits as told by your health care providers, including therapists. This is important.  Contact a health care provider if:  · Your symptoms do not get better with treatment and home care.  Get help right away if:  · You have chest pain.  · You have trouble breathing that is not relieved by doing exercises and following instructions from your health care providers.  Summary  · Paradoxical vocal fold motion is a condition that causes your vocal cords (vocal folds) to close when you try to breathe.  · This condition is often triggered by stress or irritation of your vocal cords.  · Symptoms include sudden shortness of breath and noisy breathing (stridor).  · Doing a vocal cord exam (laryngoscopy) is the most reliable way to diagnose this condition.  · This condition is treated with speech or voice therapy and talk therapy (psychotherapy).  This information is not intended to replace advice given to you by your health care provider. Make sure you discuss any questions you have with your health care provider.  Document Released: 08/15/2018 Document Revised: 04/09/2020 Document Reviewed: 08/15/2018  [employer] Patient Education © 2020 [employer]

## 2021-01-25 ENCOUNTER — TELEPHONE (OUTPATIENT)
Dept: FAMILY MEDICINE | Facility: CLINIC | Age: 46
End: 2021-01-25

## 2021-01-25 NOTE — LETTER
2021     [facility] Customer Resolution Team  [address] [address]  [place] [address]    To whom it may concern :    Re:  Elise Bean  [address] [address]    1975      Please accept this letter regarding my above patient and the denial of Factor V Leiden testing done 2020.  She presented to the office after an emergency room visit to [facility] 2020 due to palpitations and chest pain.  The work up proved negative ruling out cardiac etiology of her symptoms.  She continued with these prompting an appointment with cardiology where an holter monitor and echocardiogram were ordered.    At her follow up with me she related a family history of blood clots but was unclear as to the cause as her relatives reside in [place].  In the effort to rule out an inherited disorder causing her chest pains and  eliminating risk for DVT/PE the Factor V Leiden test was ordered as well a Protein S and C levels and a D dimer level.  In fact the Factor V testing proved POSITIVE and proper antiplatelet regimen was begun.    Clearly this testing was indicated and should be paid by the plan.      If your require any further information, please do not hesitate to contact me.      Sincerely,        Elisha Romero,

## 2021-02-26 NOTE — TELEPHONE ENCOUNTER
Pt informed, spoke with [employer] today, they denied final appeal as well. Pt will be responsible for the bill.

## 2021-03-08 ENCOUNTER — OFFICE VISIT (OUTPATIENT)
Dept: FAMILY MEDICINE | Facility: CLINIC | Age: 46
End: 2021-03-08
Payer: COMMERCIAL

## 2021-03-08 VITALS
HEART RATE: 82 BPM | HEIGHT: 64 IN | RESPIRATION RATE: 16 BRPM | OXYGEN SATURATION: 98 % | TEMPERATURE: 97 F | WEIGHT: 137 LBS | BODY MASS INDEX: 23.39 KG/M2 | SYSTOLIC BLOOD PRESSURE: 108 MMHG | DIASTOLIC BLOOD PRESSURE: 80 MMHG

## 2021-03-08 DIAGNOSIS — F51.01 PRIMARY INSOMNIA: ICD-10-CM

## 2021-03-08 DIAGNOSIS — E55.9 VITAMIN D DEFICIENCY: ICD-10-CM

## 2021-03-08 DIAGNOSIS — Z00.00 WELL WOMAN EXAM WITHOUT GYNECOLOGICAL EXAM: Primary | ICD-10-CM

## 2021-03-08 DIAGNOSIS — D68.51 HETEROZYGOUS FACTOR V LEIDEN MUTATION (CMS/HCC): ICD-10-CM

## 2021-03-08 DIAGNOSIS — I87.2 VENOUS INSUFFICIENCY OF BOTH LOWER EXTREMITIES: ICD-10-CM

## 2021-03-08 PROCEDURE — 99396 PREV VISIT EST AGE 40-64: CPT | Performed by: FAMILY MEDICINE

## 2021-03-08 ASSESSMENT — ENCOUNTER SYMPTOMS
LIGHT-HEADEDNESS: 0
SEIZURES: 0
WEAKNESS: 0
CONSTIPATION: 0
COUGH: 0
DIARRHEA: 0
DYSURIA: 0
BRUISES/BLEEDS EASILY: 0
VOICE CHANGE: 0
HEADACHES: 0
CHILLS: 0
ABDOMINAL PAIN: 0
UNEXPECTED WEIGHT CHANGE: 0
STRIDOR: 0
SHORTNESS OF BREATH: 0
TREMORS: 0
ARTHRALGIAS: 0
FATIGUE: 0
SLEEP DISTURBANCE: 0
SORE THROAT: 0
BLOOD IN STOOL: 0
WHEEZING: 0
FEVER: 0
CHEST TIGHTNESS: 0
NAUSEA: 0
BACK PAIN: 0
FREQUENCY: 0
DIZZINESS: 0

## 2021-03-08 NOTE — PROGRESS NOTES
Subjective      Patient ID: Elise Bean is a 45 y.o. female.    HPI     Generally well   Following diet - plans better exercise  Has GYN scheduled     The following have been reviewed and updated as appropriate in this visit:  Allergies  Meds  Problems       Review of Systems   Constitutional: Negative for chills, fatigue, fever and unexpected weight change.   HENT: Negative for ear pain, hearing loss, sore throat, tinnitus and voice change.    Eyes: Negative for visual disturbance.   Respiratory: Negative for cough, chest tightness, shortness of breath, wheezing and stridor.    Cardiovascular: Positive for palpitations (fleeting -no alarm signs). Negative for chest pain and leg swelling.   Gastrointestinal: Negative for abdominal pain, blood in stool, constipation, diarrhea and nausea.   Endocrine: Negative for cold intolerance and heat intolerance.   Genitourinary: Negative for dysuria, frequency, pelvic pain, vaginal bleeding and vaginal discharge.   Musculoskeletal: Negative for arthralgias, back pain and gait problem.   Skin: Negative for color change.   Neurological: Negative for dizziness, tremors, seizures, syncope, weakness, light-headedness and headaches.   Hematological: Does not bruise/bleed easily.   Psychiatric/Behavioral: Negative for sleep disturbance.       Current Outpatient Medications   Medication Sig Dispense Refill   • cholecalciferol, vitamin D3, (VITAMIN D3) 4,000 unit capsule Take 1 capsule daily w/ largest meal of the day.       No current facility-administered medications for this visit.      No past medical history on file.  Family History   Problem Relation Age of Onset   • Hypertension Biological Mother    • Hypertension Biological Father    • Heart disease Biological Father    • Diabetes Biological Father    • Ovarian cancer Biological Sister    • Ovarian cancer Mother's Sister      Past Surgical History:   Procedure Laterality Date   •  SECTION       Allergies  "  Allergen Reactions   • No Known Drug Allergies      Social History     Socioeconomic History   • Marital status:      Spouse name: Not on file   • Number of children: Not on file   • Years of education: Not on file   • Highest education level: Not on file   Occupational History   • Not on file   Social Needs   • Financial resource strain: Not on file   • Food insecurity     Worry: Not on file     Inability: Not on file   • Transportation needs     Medical: Not on file     Non-medical: Not on file   Tobacco Use   • Smoking status: Former Smoker   • Smokeless tobacco: Never Used   Substance and Sexual Activity   • Alcohol use: Never     Frequency: Never   • Drug use: Never   • Sexual activity: Yes     Partners: Male   Lifestyle   • Physical activity     Days per week: Not on file     Minutes per session: Not on file   • Stress: Not on file   Relationships   • Social connections     Talks on phone: Not on file     Gets together: Not on file     Attends Mosque service: Not on file     Active member of club or organization: Not on file     Attends meetings of clubs or organizations: Not on file     Relationship status: Not on file   • Intimate partner violence     Fear of current or ex partner: Not on file     Emotionally abused: Not on file     Physically abused: Not on file     Forced sexual activity: Not on file   Other Topics Concern   • Not on file   Social History Narrative   • Not on file     Vitals:    03/08/21 1008 03/08/21 1023   BP: 112/68 108/80   BP Location: Left upper arm    Patient Position: Sitting    Pulse: 82    Resp: 16    Temp: 36.1 °C (97 °F)    TempSrc: Temporal    SpO2: 98%    Weight: 62.1 kg (137 lb)    Height: 1.626 m (5' 4.02\")        Objective     Physical Exam  Constitutional:       Appearance: Normal appearance. She is well-developed.   HENT:      Head: Normocephalic.      Right Ear: Tympanic membrane normal.      Left Ear: Tympanic membrane normal.      Nose: Nose normal.      " Mouth/Throat:      Mouth: Mucous membranes are moist.      Pharynx: Oropharynx is clear.   Eyes:      General: Lids are normal. Lids are everted, no foreign bodies appreciated.      Pupils: Pupils are equal, round, and reactive to light.      Funduscopic exam:     Right eye: No AV nicking.         Left eye: No AV nicking.   Neck:      Thyroid: No thyromegaly.      Vascular: No carotid bruit.   Cardiovascular:      Rate and Rhythm: Normal rate and regular rhythm.      Pulses: Normal pulses.      Heart sounds: Normal heart sounds. No murmur.   Pulmonary:      Effort: Pulmonary effort is normal.      Breath sounds: Normal breath sounds.   Abdominal:      General: Bowel sounds are normal. There is no abdominal bruit.      Palpations: Abdomen is soft. There is no hepatomegaly or splenomegaly.      Tenderness: There is no abdominal tenderness. There is no right CVA tenderness or left CVA tenderness.      Hernia: There is no hernia in the left inguinal area.   Genitourinary:     Vagina: Normal.      Adnexa:         Right: No mass.          Left: No mass.     Musculoskeletal:      Right shoulder: Normal.      Right lower leg: No edema.      Left lower leg: No edema.   Lymphadenopathy:      Cervical: No cervical adenopathy.      Upper Body:      Right upper body: No supraclavicular adenopathy.      Left upper body: No supraclavicular adenopathy.   Skin:     General: Skin is warm.      Findings: No rash.   Neurological:      Mental Status: She is alert and oriented to person, place, and time.   Psychiatric:         Mood and Affect: Mood is anxious.         Speech: Speech normal.         Behavior: Behavior normal.         Cognition and Memory: Cognition normal.         Assessment/Plan   Problem List Items Addressed This Visit        Circulatory    Venous insufficiency of both lower extremities     Support stockings            Digestive    Vitamin D deficiency     Check labs re dosing - benefits vitamin D discussed  Recent labs  reviewed and discussed with pt          Relevant Orders    Vitamin D 25 hydroxy       Hematologic    Heterozygous factor V Leiden mutation (CMS/HCC)     Continue ASA gr 81         Relevant Orders    Lipid panel    TSH 3rd Generation       Other    Primary insomnia      Other Visit Diagnoses     Well woman exam without gynecological exam    -  Primary    Relevant Orders    CBC and Differential    Comprehensive metabolic panel    Urinalysis with Reflex Culture          Elisha Romero,   3/10/2021

## 2021-03-10 ASSESSMENT — ENCOUNTER SYMPTOMS
PALPITATIONS: 1
COLOR CHANGE: 0

## 2021-03-13 LAB
25(OH)D3 SERPL-MCNC: 27 NG/ML (ref 30–100)
ALBUMIN SERPL-MCNC: 4 G/DL (ref 3.6–5.1)
ALBUMIN/GLOB SERPL: 1.7 (CALC) (ref 1–2.5)
ALP SERPL-CCNC: 32 U/L (ref 31–125)
ALT SERPL-CCNC: 8 U/L (ref 6–29)
APPEARANCE UR: ABNORMAL
AST SERPL-CCNC: 13 U/L (ref 10–35)
BACTERIA #/AREA URNS HPF: ABNORMAL /HPF
BACTERIA UR CULT: ABNORMAL
BASOPHILS # BLD AUTO: 73 CELLS/UL (ref 0–200)
BASOPHILS NFR BLD AUTO: 1.4 %
BILIRUB SERPL-MCNC: 1.1 MG/DL (ref 0.2–1.2)
BILIRUB UR QL STRIP: NEGATIVE
BUN SERPL-MCNC: 11 MG/DL (ref 7–25)
BUN/CREAT SERPL: NORMAL (CALC) (ref 6–22)
CALCIUM SERPL-MCNC: 9 MG/DL (ref 8.6–10.2)
CHLORIDE SERPL-SCNC: 103 MMOL/L (ref 98–110)
CHOLEST SERPL-MCNC: 186 MG/DL
CHOLEST/HDLC SERPL: 2.1 (CALC)
CO2 SERPL-SCNC: 28 MMOL/L (ref 20–32)
COLOR UR: YELLOW
CREAT SERPL-MCNC: 0.56 MG/DL (ref 0.5–1.1)
EOSINOPHIL # BLD AUTO: 120 CELLS/UL (ref 15–500)
EOSINOPHIL NFR BLD AUTO: 2.3 %
ERYTHROCYTE [DISTWIDTH] IN BLOOD BY AUTOMATED COUNT: 12.7 % (ref 11–15)
GLOBULIN SER CALC-MCNC: 2.3 G/DL (CALC) (ref 1.9–3.7)
GLUCOSE SERPL-MCNC: 91 MG/DL (ref 65–99)
GLUCOSE UR QL STRIP: NEGATIVE
HCT VFR BLD AUTO: 38.1 % (ref 35–45)
HDLC SERPL-MCNC: 89 MG/DL
HGB BLD-MCNC: 12.6 G/DL (ref 11.7–15.5)
HGB UR QL STRIP: ABNORMAL
HYALINE CASTS #/AREA URNS LPF: ABNORMAL /LPF
KETONES UR QL STRIP: NEGATIVE
LDLC SERPL CALC-MCNC: 83 MG/DL (CALC)
LEUKOCYTE ESTERASE UR QL STRIP: NEGATIVE
LYMPHOCYTES # BLD AUTO: 2538 CELLS/UL (ref 850–3900)
LYMPHOCYTES NFR BLD AUTO: 48.8 %
MCH RBC QN AUTO: 30.6 PG (ref 27–33)
MCHC RBC AUTO-ENTMCNC: 33.1 G/DL (ref 32–36)
MCV RBC AUTO: 92.5 FL (ref 80–100)
MONOCYTES # BLD AUTO: 421 CELLS/UL (ref 200–950)
MONOCYTES NFR BLD AUTO: 8.1 %
NEUTROPHILS # BLD AUTO: 2049 CELLS/UL (ref 1500–7800)
NEUTROPHILS NFR BLD AUTO: 39.4 %
NITRITE UR QL STRIP: NEGATIVE
NONHDLC SERPL-MCNC: 97 MG/DL (CALC)
PH UR STRIP: 6 [PH] (ref 5–8)
PLATELET # BLD AUTO: 209 THOUSAND/UL (ref 140–400)
PMV BLD REES-ECKER: 11.8 FL (ref 7.5–12.5)
POTASSIUM SERPL-SCNC: 3.9 MMOL/L (ref 3.5–5.3)
PROT SERPL-MCNC: 6.3 G/DL (ref 6.1–8.1)
PROT UR QL STRIP: NEGATIVE
QUEST EGFR NON-AFR. AMERICAN: 113 ML/MIN/1.73M2
RBC # BLD AUTO: 4.12 MILLION/UL (ref 3.8–5.1)
RBC #/AREA URNS HPF: ABNORMAL /HPF
SERVICE CMNT-IMP: ABNORMAL
SODIUM SERPL-SCNC: 138 MMOL/L (ref 135–146)
SP GR UR STRIP: 1.02 (ref 1–1.03)
SQUAMOUS #/AREA URNS HPF: ABNORMAL /HPF
TRIGL SERPL-MCNC: 56 MG/DL
TSH SERPL-ACNC: 3.89 MIU/L
WBC # BLD AUTO: 5.2 THOUSAND/UL (ref 3.8–10.8)
WBC #/AREA URNS HPF: ABNORMAL /HPF

## 2021-03-22 ENCOUNTER — TRANSCRIBE ORDERS (OUTPATIENT)
Dept: SCHEDULING | Age: 46
End: 2021-03-22

## 2021-03-22 DIAGNOSIS — R10.2 PELVIC AND PERINEAL PAIN: Primary | ICD-10-CM

## 2021-03-25 ENCOUNTER — TELEPHONE (OUTPATIENT)
Dept: FAMILY MEDICINE | Facility: CLINIC | Age: 46
End: 2021-03-25

## 2021-03-26 NOTE — TELEPHONE ENCOUNTER
Spoke with patient. Unable to log on to [employer], gave pt # to IT help desk.  Pt has 4000 units of Vit but does not take daily. Will start back up on that

## 2021-03-26 NOTE — TELEPHONE ENCOUNTER
EMR INDICATES SHE HAS MY CHART  LABS ALL FANTASTIC     LDL CHOL    URINE NEGATIVE FOR PROTEIN  ONLY ISSUE VIT D LOW AT      ? TAKING DAILY D3 4000 UNITS - IF NO NEED TO TAKE - IF YES ADD ADDITIONAL 1000 QD

## 2021-04-06 ENCOUNTER — DOCUMENTATION (OUTPATIENT)
Dept: FAMILY MEDICINE | Facility: CLINIC | Age: 46
End: 2021-04-06

## 2021-04-06 NOTE — PROGRESS NOTES
Care Gap Team has outreached to Elise Bean on behalf of their primary care provider.      Care Gap Source: [facility]    Care Gap(s) Identified: Cervical Cancer Screening     Chart Review Completed: Yes      Recent office note states that pt is scheduled for annual GYN exam, no outreach needed.

## 2021-04-28 ENCOUNTER — HOSPITAL ENCOUNTER (OUTPATIENT)
Dept: RADIOLOGY | Age: 46
Discharge: HOME | End: 2021-04-28
Attending: OBSTETRICS & GYNECOLOGY
Payer: COMMERCIAL

## 2021-04-28 DIAGNOSIS — R10.2 PELVIC AND PERINEAL PAIN: ICD-10-CM

## 2021-04-28 PROCEDURE — 76830 TRANSVAGINAL US NON-OB: CPT

## 2021-04-28 PROCEDURE — 76856 US EXAM PELVIC COMPLETE: CPT | Mod: 59

## 2021-11-12 ENCOUNTER — TELEPHONE (OUTPATIENT)
Dept: FAMILY MEDICINE | Facility: CLINIC | Age: 46
End: 2021-11-12

## 2021-11-12 NOTE — TELEPHONE ENCOUNTER
Hello, the pt ask to be seen for thick mucus in the throat x's 2-3 days..no other symptoms. Her call back number is [phone] her pharmacy is [facility] in [place].

## 2022-01-11 ENCOUNTER — OFFICE VISIT (OUTPATIENT)
Dept: FAMILY MEDICINE | Facility: CLINIC | Age: 47
End: 2022-01-11
Payer: COMMERCIAL

## 2022-01-11 VITALS
DIASTOLIC BLOOD PRESSURE: 70 MMHG | HEIGHT: 64 IN | OXYGEN SATURATION: 99 % | BODY MASS INDEX: 24.59 KG/M2 | WEIGHT: 144 LBS | HEART RATE: 83 BPM | SYSTOLIC BLOOD PRESSURE: 104 MMHG | TEMPERATURE: 97.9 F | RESPIRATION RATE: 18 BRPM

## 2022-01-11 DIAGNOSIS — L23.9 ECZEMA, ALLERGIC: Primary | ICD-10-CM

## 2022-01-11 PROCEDURE — 99212 OFFICE O/P EST SF 10 MIN: CPT | Performed by: FAMILY MEDICINE

## 2022-01-11 PROCEDURE — 3008F BODY MASS INDEX DOCD: CPT | Performed by: FAMILY MEDICINE

## 2022-01-11 RX ORDER — FLUOCINONIDE CREAM (EMULSIFIED BASE) 0.5 MG/G
CREAM TOPICAL 2 TIMES DAILY
Qty: 30 G | Refills: 3 | Status: SHIPPED | OUTPATIENT
Start: 2022-01-11 | End: 2022-03-09

## 2022-01-11 NOTE — PROGRESS NOTES
"Subjective      Patient ID: Elise Bean is a 46 y.o. female.    HPI    Began with itching under arms 2021       Red \"circles\"   then rash palms/elbows - 2021  Using OTC moisturizer          The following have been reviewed and updated as appropriate in this visit:        Review of Systems   Constitutional: Negative for chills, fatigue and fever.   Skin: Positive for rash.       Current Outpatient Medications   Medication Sig Dispense Refill   • cholecalciferol, vitamin D3, (VITAMIN D3) 4,000 unit capsule Take 1 capsule daily w/ largest meal of the day.     • fluocinonide-emollient (fluocinonide-emollient) 0.05 % cream Apply topically 2 (two) times a day. 30 g 3     No current facility-administered medications for this visit.     History reviewed. No pertinent past medical history.  Family History   Problem Relation Age of Onset   • Hypertension Biological Mother    • Hypertension Biological Father    • Heart disease Biological Father    • Diabetes Biological Father    • Ovarian cancer Biological Sister    • Ovarian cancer Mother's Sister      Past Surgical History:   Procedure Laterality Date   •  SECTION       Allergies   Allergen Reactions   • No Known Drug Allergies      Social History     Socioeconomic History   • Marital status:      Spouse name: Not on file   • Number of children: Not on file   • Years of education: Not on file   • Highest education level: Not on file   Occupational History   • Not on file   Tobacco Use   • Smoking status: Former Smoker   • Smokeless tobacco: Never Used   Substance and Sexual Activity   • Alcohol use: Never   • Drug use: Never   • Sexual activity: Yes     Partners: Male   Other Topics Concern   • Not on file   Social History Narrative   • Not on file     Social Determinants of Health     Financial Resource Strain: Not on file   Food Insecurity: Not on file   Transportation Needs: Not on file   Physical Activity: Not on file   Stress: Not on file " "  Social Connections: Not on file   Intimate Partner Violence: Not on file   Housing Stability: Not on file     Vitals:    01/11/22 0829   BP: 104/70   BP Location: Left upper arm   Patient Position: Sitting   Pulse: 83   Resp: 18   Temp: 36.6 °C (97.9 °F)   TempSrc: Oral   SpO2: 99%   Weight: 65.3 kg (144 lb)   Height: 1.626 m (5' 4.02\")       Objective     Physical Exam  Constitutional:       General: She is not in acute distress.  Skin:     Comments: Raised dry patches bilat antecubital region arm  Wrists palmar surfaces bilat   Neurological:      Mental Status: She is alert.         Assessment/Plan   Problem List Items Addressed This Visit     None      Visit Diagnoses     Eczema, allergic    -  Primary    topical RX  counseled re chronic nature of eczema    Relevant Medications    fluocinonide-emollient (fluocinonide-emollient) 0.05 % cream          Elisha Romero DO  1/15/2022  "

## 2022-01-15 ASSESSMENT — ENCOUNTER SYMPTOMS
CHILLS: 0
FATIGUE: 0
FEVER: 0

## 2022-03-09 ENCOUNTER — OFFICE VISIT (OUTPATIENT)
Dept: FAMILY MEDICINE | Facility: CLINIC | Age: 47
End: 2022-03-09
Payer: COMMERCIAL

## 2022-03-09 VITALS
WEIGHT: 147 LBS | HEART RATE: 70 BPM | OXYGEN SATURATION: 99 % | RESPIRATION RATE: 18 BRPM | DIASTOLIC BLOOD PRESSURE: 72 MMHG | TEMPERATURE: 97.9 F | SYSTOLIC BLOOD PRESSURE: 118 MMHG | HEIGHT: 66 IN | BODY MASS INDEX: 23.63 KG/M2

## 2022-03-09 DIAGNOSIS — I87.2 VENOUS INSUFFICIENCY OF BOTH LOWER EXTREMITIES: ICD-10-CM

## 2022-03-09 DIAGNOSIS — Z00.00 WELL WOMAN EXAM WITHOUT GYNECOLOGICAL EXAM: Primary | ICD-10-CM

## 2022-03-09 DIAGNOSIS — E55.9 VITAMIN D DEFICIENCY: ICD-10-CM

## 2022-03-09 DIAGNOSIS — D68.51 HETEROZYGOUS FACTOR V LEIDEN MUTATION (CMS/HCC): ICD-10-CM

## 2022-03-09 PROCEDURE — 3008F BODY MASS INDEX DOCD: CPT | Performed by: FAMILY MEDICINE

## 2022-03-09 PROCEDURE — 99396 PREV VISIT EST AGE 40-64: CPT | Performed by: FAMILY MEDICINE

## 2022-03-09 ASSESSMENT — ENCOUNTER SYMPTOMS
HEADACHES: 0
DIZZINESS: 1
SHORTNESS OF BREATH: 0
STRIDOR: 0
SLEEP DISTURBANCE: 0
UNEXPECTED WEIGHT CHANGE: 0
VOICE CHANGE: 0
ARTHRALGIAS: 0
WEAKNESS: 0
DYSURIA: 0
COLOR CHANGE: 0
BACK PAIN: 0
CHILLS: 0
TREMORS: 0
FATIGUE: 0
BLOOD IN STOOL: 0
ABDOMINAL PAIN: 0
LIGHT-HEADEDNESS: 0
SORE THROAT: 0
FREQUENCY: 0
FEVER: 0
WHEEZING: 0
CHEST TIGHTNESS: 0
BRUISES/BLEEDS EASILY: 0
COUGH: 0
NAUSEA: 0
SEIZURES: 0
DIARRHEA: 0
CONSTIPATION: 0
PALPITATIONS: 0

## 2022-03-09 NOTE — PATIENT INSTRUCTIONS
CHECK LABS    Routine advice given on diet/exercise/weight  Recommend monthly self breast exam  Recommend calcium with D3  Immunization update discussed

## 2022-03-09 NOTE — PROGRESS NOTES
Subjective      Patient ID: Elise Bean is a 46 y.o. female.    HPI     Generally well  Following diet - plans better exercise    CONCERNS : PND  - ? allergies  Seen by jen richmond  The following have been reviewed and updated as appropriate in this visit:        Review of Systems   Constitutional: Negative for chills, fatigue, fever and unexpected weight change.   HENT: Positive for postnasal drip. Negative for ear pain, hearing loss, sore throat, tinnitus and voice change.    Eyes: Negative for visual disturbance (eye exam current).   Respiratory: Negative for cough, chest tightness, shortness of breath, wheezing and stridor.    Cardiovascular: Negative for chest pain, palpitations and leg swelling.   Gastrointestinal: Negative for abdominal pain, blood in stool, constipation, diarrhea and nausea.   Endocrine: Negative for cold intolerance and heat intolerance.   Genitourinary: Negative for dysuria, frequency, pelvic pain, vaginal bleeding and vaginal discharge.   Musculoskeletal: Negative for arthralgias, back pain and gait problem.   Skin: Negative for color change.   Neurological: Positive for dizziness (off balance - no syncope). Negative for tremors, seizures, syncope, weakness, light-headedness and headaches.   Hematological: Does not bruise/bleed easily.   Psychiatric/Behavioral: Negative for sleep disturbance.       Current Outpatient Medications   Medication Sig Dispense Refill   • cholecalciferol, vitamin D3, (VITAMIN D3) 4,000 unit capsule Take 1 capsule daily w/ largest meal of the day.       No current facility-administered medications for this visit.     History reviewed. No pertinent past medical history.  Family History   Problem Relation Age of Onset   • Hypertension Biological Mother    • Hypertension Biological Father    • Heart disease Biological Father    • Diabetes Biological Father    • Ovarian cancer Biological Sister    • Ovarian cancer Mother's Sister      Past Surgical History:  "  Procedure Laterality Date   •  SECTION       Allergies   Allergen Reactions   • No Known Drug Allergies      Social History     Socioeconomic History   • Marital status:      Spouse name: Not on file   • Number of children: Not on file   • Years of education: Not on file   • Highest education level: Not on file   Occupational History   • Not on file   Tobacco Use   • Smoking status: Former Smoker   • Smokeless tobacco: Never Used   Substance and Sexual Activity   • Alcohol use: Never   • Drug use: Never   • Sexual activity: Yes     Partners: Male   Other Topics Concern   • Not on file   Social History Narrative   • Not on file     Social Determinants of Health     Financial Resource Strain: Not on file   Food Insecurity: Not on file   Transportation Needs: Not on file   Physical Activity: Not on file   Stress: Not on file   Social Connections: Not on file   Intimate Partner Violence: Not on file   Housing Stability: Not on file     Vitals:    22 0753 22 0812   BP: 120/70 118/72   BP Location: Left upper arm    Patient Position: Sitting    Pulse: 70    Resp: 18    Temp: 36.6 °C (97.9 °F)    TempSrc: Oral    SpO2: 99%    Weight: 66.7 kg (147 lb)    Height: 1.664 m (5' 5.5\")        Objective     Physical Exam  Constitutional:       Appearance: Normal appearance. She is well-developed.   HENT:      Head: Normocephalic.      Right Ear: Tympanic membrane normal.      Left Ear: Tympanic membrane normal.      Nose: Nose normal.      Mouth/Throat:      Mouth: Mucous membranes are moist.      Pharynx: Oropharynx is clear.   Eyes:      General: Lids are normal. Lids are everted, no foreign bodies appreciated.      Pupils: Pupils are equal, round, and reactive to light.      Funduscopic exam:     Right eye: No AV nicking.         Left eye: No AV nicking.   Neck:      Thyroid: No thyromegaly.      Vascular: No carotid bruit.   Cardiovascular:      Rate and Rhythm: Normal rate and regular rhythm.      " Pulses: Normal pulses.      Heart sounds: Normal heart sounds. No murmur heard.  Pulmonary:      Effort: Pulmonary effort is normal.      Breath sounds: Normal breath sounds.   Chest:   Breasts:      Right: No supraclavicular adenopathy.      Left: No supraclavicular adenopathy.       Abdominal:      General: Bowel sounds are normal. There is no abdominal bruit.      Palpations: Abdomen is soft. There is no hepatomegaly or splenomegaly.      Tenderness: There is no abdominal tenderness. There is no right CVA tenderness, left CVA tenderness, guarding or rebound.      Hernia: No hernia is present. There is no hernia in the left inguinal area.   Musculoskeletal:      Right lower leg: No edema.      Left lower leg: No edema.   Lymphadenopathy:      Cervical: No cervical adenopathy.      Upper Body:      Right upper body: No supraclavicular adenopathy.      Left upper body: No supraclavicular adenopathy.   Skin:     General: Skin is warm.      Findings: No rash.   Neurological:      Mental Status: She is alert and oriented to person, place, and time.   Psychiatric:         Mood and Affect: Mood normal.         Speech: Speech normal.         Behavior: Behavior normal.         Assessment/Plan   Problem List Items Addressed This Visit        Circulatory    Venous insufficiency of both lower extremities     Support stockings            Digestive    Vitamin D deficiency     Check labs re dosing - benefits vitamin D discussed  Recent labs reviewed and discussed with pt          Relevant Orders    Vitamin D 25 hydroxy       Hematologic    Heterozygous factor V Leiden mutation (CMS/HCC)     Continue ASA gr 81           Other Visit Diagnoses     Well woman exam without gynecological exam    -  Primary    Relevant Orders    CBC and Differential    Comprehensive metabolic panel    Lipid panel    TSH 3rd Generation    Urinalysis with Reflex Culture          Elisha Romero,   3/13/2022

## 2022-03-19 LAB
25(OH)D3 SERPL-MCNC: 23 NG/ML (ref 30–100)
ALBUMIN SERPL-MCNC: 4.3 G/DL (ref 3.6–5.1)
ALBUMIN/GLOB SERPL: 1.7 (CALC) (ref 1–2.5)
ALP SERPL-CCNC: 34 U/L (ref 31–125)
ALT SERPL-CCNC: 8 U/L (ref 6–29)
APPEARANCE UR: ABNORMAL
AST SERPL-CCNC: 14 U/L (ref 10–35)
BACTERIA #/AREA URNS HPF: ABNORMAL /HPF
BACTERIA UR CULT: NORMAL
BASOPHILS # BLD AUTO: 50 CELLS/UL (ref 0–200)
BASOPHILS NFR BLD AUTO: 1.1 %
BILIRUB SERPL-MCNC: 1.1 MG/DL (ref 0.2–1.2)
BILIRUB UR QL STRIP: NEGATIVE
BUN SERPL-MCNC: 13 MG/DL (ref 7–25)
BUN/CREAT SERPL: NORMAL (CALC) (ref 6–22)
CALCIUM SERPL-MCNC: 9 MG/DL (ref 8.6–10.2)
CHLORIDE SERPL-SCNC: 102 MMOL/L (ref 98–110)
CHOLEST SERPL-MCNC: 233 MG/DL
CHOLEST/HDLC SERPL: 2.3 (CALC)
CO2 SERPL-SCNC: 29 MMOL/L (ref 20–32)
COLOR UR: YELLOW
CREAT SERPL-MCNC: 0.63 MG/DL (ref 0.5–1.1)
EOSINOPHIL # BLD AUTO: 117 CELLS/UL (ref 15–500)
EOSINOPHIL NFR BLD AUTO: 2.6 %
ERYTHROCYTE [DISTWIDTH] IN BLOOD BY AUTOMATED COUNT: 12.4 % (ref 11–15)
GLOBULIN SER CALC-MCNC: 2.5 G/DL (CALC) (ref 1.9–3.7)
GLUCOSE SERPL-MCNC: 81 MG/DL (ref 65–99)
GLUCOSE UR QL STRIP: NEGATIVE
HCT VFR BLD AUTO: 40.1 % (ref 35–45)
HDLC SERPL-MCNC: 102 MG/DL
HGB BLD-MCNC: 13 G/DL (ref 11.7–15.5)
HGB UR QL STRIP: NEGATIVE
HYALINE CASTS #/AREA URNS LPF: ABNORMAL /LPF
KETONES UR QL STRIP: NEGATIVE
LDLC SERPL CALC-MCNC: 116 MG/DL (CALC)
LEUKOCYTE ESTERASE UR QL STRIP: NEGATIVE
LYMPHOCYTES # BLD AUTO: 1850 CELLS/UL (ref 850–3900)
LYMPHOCYTES NFR BLD AUTO: 41.1 %
MCH RBC QN AUTO: 29.7 PG (ref 27–33)
MCHC RBC AUTO-ENTMCNC: 32.4 G/DL (ref 32–36)
MCV RBC AUTO: 91.8 FL (ref 80–100)
MONOCYTES # BLD AUTO: 369 CELLS/UL (ref 200–950)
MONOCYTES NFR BLD AUTO: 8.2 %
NEUTROPHILS # BLD AUTO: 2115 CELLS/UL (ref 1500–7800)
NEUTROPHILS NFR BLD AUTO: 47 %
NITRITE UR QL STRIP: NEGATIVE
NONHDLC SERPL-MCNC: 131 MG/DL (CALC)
PH UR STRIP: 7.5 [PH] (ref 5–8)
PLATELET # BLD AUTO: 214 THOUSAND/UL (ref 140–400)
PMV BLD REES-ECKER: 11.3 FL (ref 7.5–12.5)
POTASSIUM SERPL-SCNC: 4.3 MMOL/L (ref 3.5–5.3)
PROT SERPL-MCNC: 6.8 G/DL (ref 6.1–8.1)
PROT UR QL STRIP: NEGATIVE
QUEST EGFR AFRICAN AMERICAN: 125 ML/MIN/1.73M2
QUEST EGFR NON-AFR. AMERICAN: 108 ML/MIN/1.73M2
RBC # BLD AUTO: 4.37 MILLION/UL (ref 3.8–5.1)
RBC #/AREA URNS HPF: ABNORMAL /HPF
SODIUM SERPL-SCNC: 137 MMOL/L (ref 135–146)
SP GR UR STRIP: 1.02 (ref 1–1.03)
SQUAMOUS #/AREA URNS HPF: ABNORMAL /HPF
TRIGL SERPL-MCNC: 54 MG/DL
TSH SERPL-ACNC: 2.18 MIU/L
WBC # BLD AUTO: 4.5 THOUSAND/UL (ref 3.8–10.8)
WBC #/AREA URNS HPF: ABNORMAL /HPF

## 2022-05-06 ENCOUNTER — APPOINTMENT (RX ONLY)
Dept: URBAN - METROPOLITAN AREA CLINIC 374 | Facility: CLINIC | Age: 47
Setting detail: DERMATOLOGY
End: 2022-05-06

## 2022-05-06 DIAGNOSIS — S31000A OPEN WOUND(S) (MULTIPLE) OF UNSPECIFIED SITE(S), WITHOUT MENTION OF COMPLICATION: ICD-10-CM

## 2022-05-06 PROBLEM — S91.331A PUNCTURE WOUND WITHOUT FOREIGN BODY, RIGHT FOOT, INITIAL ENCOUNTER: Status: ACTIVE | Noted: 2022-05-06

## 2022-05-06 PROCEDURE — ? DIAGNOSIS COMMENT

## 2022-05-06 PROCEDURE — ? PRESCRIPTION MEDICATION MANAGEMENT

## 2022-05-06 PROCEDURE — 99203 OFFICE O/P NEW LOW 30 MIN: CPT

## 2022-05-06 PROCEDURE — ? COUNSELING

## 2022-05-06 PROCEDURE — ? MEDICATION COUNSELING

## 2022-05-06 PROCEDURE — ? PRESCRIPTION

## 2022-05-06 PROCEDURE — ? PHOTO-DOCUMENTATION

## 2022-05-06 RX ORDER — TRIAMCINOLONE ACETONIDE 1 MG/G
1 OINTMENT TOPICAL BID
Qty: 15 | Refills: 0 | Status: ERX | COMMUNITY
Start: 2022-05-06

## 2022-05-06 RX ADMIN — TRIAMCINOLONE ACETONIDE 1: 1 OINTMENT TOPICAL at 00:00

## 2022-05-06 ASSESSMENT — LOCATION ZONE DERM
LOCATION ZONE: FEET
LOCATION ZONE: FEET

## 2022-05-06 ASSESSMENT — LOCATION DETAILED DESCRIPTION DERM
LOCATION DETAILED: LEFT MEDIAL PLANTAR HEEL
LOCATION DETAILED: LEFT MEDIAL PLANTAR HEEL

## 2022-05-06 ASSESSMENT — LOCATION SIMPLE DESCRIPTION DERM
LOCATION SIMPLE: LEFT PLANTAR SURFACE
LOCATION SIMPLE: LEFT PLANTAR SURFACE

## 2022-05-06 NOTE — PROCEDURE: MEDICATION COUNSELING
POAG, OU Counseling: I have reviewed the regimen of glaucoma drops with the patient and have stressed the importance of compliance. Patient instructed to continue present medication and return for follow-up as scheduled. Xelsdz Pregnancy And Lactation Text: This medication is Pregnancy Category D and is not considered safe during pregnancy.  The risk during breast feeding is also uncertain.

## 2022-05-06 NOTE — PROCEDURE: DIAGNOSIS COMMENT
Comment: secondary to stepping on \"sharp plant\" [place] the [place] at the [facility] Comment: secondary to stepping on \"sharp plant\" on the beach at the [facility]

## 2022-05-06 NOTE — PROCEDURE: PRESCRIPTION MEDICATION MANAGEMENT
Detail Level: Zone
Initiate Treatment: triamcinolone acetonide 0.1 % topical ointment: Apply a thin layer to left foot BID until resolved then dc
Render In Strict Bullet Format?: No

## 2022-05-06 NOTE — HPI: WOUND CHECK
How Is Your Wound Healing?: not healing
Additional History: Patient states three weeks ago she stepped on a plant that had spikes and she said that it is painful and and does not know if there is anything still left in her foot. Patient states she has been using her sons antibiotic cream.

## 2022-06-24 ENCOUNTER — OFFICE VISIT (OUTPATIENT)
Dept: FAMILY MEDICINE | Facility: CLINIC | Age: 47
End: 2022-06-24
Payer: COMMERCIAL

## 2022-06-24 VITALS
HEART RATE: 72 BPM | HEIGHT: 66 IN | TEMPERATURE: 99.6 F | RESPIRATION RATE: 16 BRPM | OXYGEN SATURATION: 98 % | SYSTOLIC BLOOD PRESSURE: 100 MMHG | BODY MASS INDEX: 23.78 KG/M2 | WEIGHT: 148 LBS | DIASTOLIC BLOOD PRESSURE: 70 MMHG

## 2022-06-24 DIAGNOSIS — J02.9 SORE THROAT: Primary | ICD-10-CM

## 2022-06-24 DIAGNOSIS — M79.10 MYALGIA: ICD-10-CM

## 2022-06-24 PROCEDURE — 87637 SARSCOV2&INF A&B&RSV AMP PRB: CPT | Performed by: FAMILY MEDICINE

## 2022-06-24 PROCEDURE — 3008F BODY MASS INDEX DOCD: CPT | Performed by: FAMILY MEDICINE

## 2022-06-24 PROCEDURE — 99213 OFFICE O/P EST LOW 20 MIN: CPT | Performed by: FAMILY MEDICINE

## 2022-06-24 ASSESSMENT — ENCOUNTER SYMPTOMS
CONSTIPATION: 0
ABDOMINAL DISTENTION: 0
WHEEZING: 0
APPETITE CHANGE: 0
DIFFICULTY URINATING: 0
DIZZINESS: 0
SINUS PRESSURE: 1
SHORTNESS OF BREATH: 0
HEADACHES: 1
SORE THROAT: 1
NUMBNESS: 0
WEAKNESS: 0
PALPITATIONS: 0
LIGHT-HEADEDNESS: 0
VOMITING: 0
ABDOMINAL PAIN: 0
FREQUENCY: 0
COUGH: 0
NAUSEA: 0
RHINORRHEA: 1
FATIGUE: 0
DIARRHEA: 0
ACTIVITY CHANGE: 0
MYALGIAS: 1

## 2022-06-24 NOTE — PROGRESS NOTES
"      Subjective      Patient ID: Elise Bean is a 46 y.o. female.  1975      HPI  Sore throat for 3 days had headache 2 days at night  No fever  Feels achy  Minimal cough  Sinus pressure  Clear rhinorrhea  No known exposure to covid   has bronchitis  The following have been reviewed and updated as appropriate in this visit:   Allergies  Meds  Problems         Review of Systems   Constitutional: Negative for activity change, appetite change and fatigue.   HENT: Positive for rhinorrhea, sinus pressure and sore throat. Negative for congestion.    Respiratory: Negative for cough, shortness of breath and wheezing.    Cardiovascular: Negative for chest pain, palpitations and leg swelling.   Gastrointestinal: Negative for abdominal distention, abdominal pain, constipation, diarrhea, nausea and vomiting.   Endocrine: Negative for cold intolerance and heat intolerance.   Genitourinary: Negative for difficulty urinating and frequency.   Musculoskeletal: Positive for myalgias.   Skin: Negative for rash.   Neurological: Positive for headaches. Negative for dizziness, weakness, light-headedness and numbness.       Objective     Vitals:    06/24/22 0914   BP: 100/70   BP Location: Left upper arm   Patient Position: Sitting   Pulse: 72   Resp: 16   Temp: 37.6 °C (99.6 °F)   TempSrc: Oral   SpO2: 98%   Weight: 67.1 kg (148 lb)   Height: 1.664 m (5' 5.5\")     Body mass index is 24.25 kg/m².    Physical Exam  Vitals and nursing note reviewed.   Constitutional:       Appearance: She is well-developed.   HENT:      Right Ear: Tympanic membrane normal.      Left Ear: Tympanic membrane normal.      Nose:      Right Sinus: No maxillary sinus tenderness or frontal sinus tenderness.      Left Sinus: No maxillary sinus tenderness or frontal sinus tenderness.      Mouth/Throat:      Pharynx: Posterior oropharyngeal erythema (minimal) present.   Neck:      Vascular: No carotid bruit or JVD.   Cardiovascular:      Rate and " Rhythm: Normal rate and regular rhythm.      Heart sounds: Normal heart sounds. No murmur heard.  Pulmonary:      Effort: Pulmonary effort is normal. No respiratory distress.      Breath sounds: Normal breath sounds. No wheezing.   Abdominal:      General: Bowel sounds are normal. There is no distension.      Palpations: Abdomen is soft. There is no mass.      Tenderness: There is no abdominal tenderness. There is no guarding or rebound.      Hernia: No hernia is present.   Musculoskeletal:         General: No tenderness.   Lymphadenopathy:      Cervical: No cervical adenopathy.   Skin:     General: Skin is warm and dry.   Neurological:      Mental Status: She is alert and oriented to person, place, and time.   Psychiatric:         Mood and Affect: Mood normal.         Behavior: Behavior normal.         Assessment/Plan   Diagnoses and all orders for this visit:    Sore throat (Primary)  Comments:  fluids  check covid  Orders:  -     SARS-COV-2 (COVID-19)/ FLU A/B, AND RSV, PCR    Myalgia  Comments:  tylenol/ibuprofen  check covid/infuenza      Anterior nares covid testing was performed using appropriate PPE  Pt understands need to quarantine until receives results  Patient  agreeable with plan and verbalized understanding

## 2022-06-25 ENCOUNTER — NURSE TRIAGE (OUTPATIENT)
Dept: FAMILY MEDICINE | Facility: CLINIC | Age: 47
End: 2022-06-25
Payer: COMMERCIAL

## 2022-06-25 LAB
FLUAV RNA SPEC QL NAA+PROBE: NEGATIVE
FLUBV RNA SPEC QL NAA+PROBE: NEGATIVE
RSV RNA SPEC QL NAA+PROBE: NEGATIVE
SARS-COV-2 RNA RESP QL NAA+PROBE: POSITIVE

## 2022-06-25 NOTE — TELEPHONE ENCOUNTER
"Patient called to discuss Covid diagnosis and symptoms. Has some \"body soreness and a very slight sore throat, nothing else\". Symptoms started around Wednesday, without shortness of breath. Supportive care for viral illness reinforced with verbalized understanding. If any changes/worsening will call back.  "

## 2022-06-25 NOTE — RESULT ENCOUNTER NOTE
Left message for pt regarding positive covid  Need to quarantine for 5 days and mask for 5 additional  Supportive  Care  To ER with sob

## 2023-03-17 ENCOUNTER — OFFICE VISIT (OUTPATIENT)
Dept: FAMILY MEDICINE | Facility: CLINIC | Age: 48
End: 2023-03-17
Payer: COMMERCIAL

## 2023-03-17 VITALS
SYSTOLIC BLOOD PRESSURE: 108 MMHG | TEMPERATURE: 97.6 F | BODY MASS INDEX: 23.95 KG/M2 | HEIGHT: 66 IN | HEART RATE: 60 BPM | WEIGHT: 149 LBS | DIASTOLIC BLOOD PRESSURE: 68 MMHG | RESPIRATION RATE: 14 BRPM | OXYGEN SATURATION: 97 %

## 2023-03-17 DIAGNOSIS — D68.51 HETEROZYGOUS FACTOR V LEIDEN MUTATION (CMS/HCC): ICD-10-CM

## 2023-03-17 DIAGNOSIS — Z12.11 SCREENING FOR COLON CANCER: ICD-10-CM

## 2023-03-17 DIAGNOSIS — E55.9 VITAMIN D DEFICIENCY: ICD-10-CM

## 2023-03-17 DIAGNOSIS — Z00.00 ANNUAL PHYSICAL EXAM: Primary | ICD-10-CM

## 2023-03-17 DIAGNOSIS — E78.5 HYPERLIPIDEMIA, UNSPECIFIED HYPERLIPIDEMIA TYPE: ICD-10-CM

## 2023-03-17 PROCEDURE — 99396 PREV VISIT EST AGE 40-64: CPT | Performed by: NURSE PRACTITIONER

## 2023-03-17 PROCEDURE — 3008F BODY MASS INDEX DOCD: CPT | Performed by: NURSE PRACTITIONER

## 2023-03-17 RX ORDER — FLUTICASONE PROPIONATE 50 MCG
1 SPRAY, SUSPENSION (ML) NASAL DAILY
Qty: 16 G | Refills: 5 | Status: SHIPPED | OUTPATIENT
Start: 2023-03-17 | End: 2024-03-18 | Stop reason: SDUPTHER

## 2023-03-17 ASSESSMENT — ENCOUNTER SYMPTOMS
CONFUSION: 0
ARTHRALGIAS: 0
VOICE CHANGE: 0
NERVOUS/ANXIOUS: 0
FREQUENCY: 0
HEADACHES: 0
COUGH: 0
VOMITING: 0
WHEEZING: 0
DIARRHEA: 0
ABDOMINAL PAIN: 0
DIFFICULTY URINATING: 0
SLEEP DISTURBANCE: 0
BLOOD IN STOOL: 0
CHILLS: 0
NAUSEA: 0
TROUBLE SWALLOWING: 0
MYALGIAS: 0
FEVER: 0
PALPITATIONS: 0
WEAKNESS: 0
LIGHT-HEADEDNESS: 0
CONSTIPATION: 0
FATIGUE: 0
TREMORS: 0
DIZZINESS: 0
ABDOMINAL DISTENTION: 0
WOUND: 0
SHORTNESS OF BREATH: 0
BRUISES/BLEEDS EASILY: 0
NUMBNESS: 0
UNEXPECTED WEIGHT CHANGE: 0
EYE PAIN: 0
CHEST TIGHTNESS: 0
DECREASED CONCENTRATION: 0
SPEECH DIFFICULTY: 0

## 2023-03-17 ASSESSMENT — PATIENT HEALTH QUESTIONNAIRE - PHQ9: SUM OF ALL RESPONSES TO PHQ9 QUESTIONS 1 & 2: 0

## 2023-03-17 NOTE — ASSESSMENT & PLAN NOTE
Does not want to get COVID or flu vaccine  Please get updated fasting labs  Healthy diet, regular exercise  F/u 1 year annual PE

## 2023-03-17 NOTE — PROGRESS NOTES
Reason for visit:   Chief Complaint   Patient presents with   • Annual Exam     Pt here for PE and states that her left knee has been painful for about 2 weeks       HPI  is a 47 y.o. female     HPI    Here for annual PE. No major illnesses or hospitalizations. Seeing vascular for varicose veins in legs. Has PAP scheduled for may, goes to GYN annually. Last mammo  23, ordered by GYN. Gets pelvic ultrasounds periodically due to family history of ovarian cancer. Has never had CRC screening. No issues except has noted some laxity in left knee, no pain .    Problem List:  Patient Active Problem List    Diagnosis Date Noted   • Screening for colon cancer 2023   • Annual physical exam 2023   • Hyperlipidemia 2023   • Heterozygous factor V Leiden mutation (CMS/HCC) 2020   • Primary insomnia 2020   • MICHELLE (generalized anxiety disorder) 2020   • Venous insufficiency of both lower extremities 2020   • Vitamin D deficiency 2019       Surgical History:  Past Surgical History:   Procedure Laterality Date   •  SECTION         Social History:  Social History     Social History Narrative   • Not on file       Family History:  Family History   Problem Relation Age of Onset   • Hypertension Biological Mother    • Hypertension Biological Father    • Heart disease Biological Father    • Diabetes Biological Father    • Ovarian cancer Biological Sister    • Ovarian cancer Mother's Sister        Allergies:  No known drug allergies    Current Medications:  Current Outpatient Medications   Medication Sig Dispense Refill   • fluticasone propionate (FLONASE) 50 mcg/actuation nasal spray Administer 1 spray into each nostril daily. 16 g 5   • cholecalciferol, vitamin D3, 100 mcg (4,000 unit) capsule Take 1 capsule daily w/ largest meal of the day.       No current facility-administered medications for this visit.         Review of Systems:  Review of Systems   Constitutional:  Negative for chills, fatigue, fever and unexpected weight change.   HENT: Negative for congestion, ear pain, hearing loss, trouble swallowing and voice change.    Eyes: Negative for pain and visual disturbance.   Respiratory: Negative for cough, chest tightness, shortness of breath and wheezing.    Cardiovascular: Negative for chest pain, palpitations and leg swelling.   Gastrointestinal: Negative for abdominal distention, abdominal pain, blood in stool, constipation, diarrhea, nausea and vomiting.   Genitourinary: Negative for difficulty urinating, frequency and urgency.   Musculoskeletal: Negative for arthralgias, gait problem and myalgias.   Skin: Negative for rash and wound.   Neurological: Negative for dizziness, tremors, speech difficulty, weakness, light-headedness, numbness and headaches.   Hematological: Does not bruise/bleed easily.   Psychiatric/Behavioral: Negative for behavioral problems, confusion, decreased concentration, sleep disturbance and suicidal ideas. The patient is not nervous/anxious.        Objective     Vital Signs:  Vitals:    03/17/23 1046   BP: 108/68   Pulse: 60   Resp: 14   Temp: 36.4 °C (97.6 °F)   SpO2: 97%       BMI:  Body mass index is 24.05 kg/m².     Physical Exam  Constitutional:       Appearance: Normal appearance. She is normal weight.   HENT:      Head: Normocephalic.      Right Ear: Tympanic membrane, ear canal and external ear normal.      Left Ear: Tympanic membrane, ear canal and external ear normal.      Nose: Nose normal.      Mouth/Throat:      Mouth: Mucous membranes are moist.      Pharynx: Oropharynx is clear.   Eyes:      General: No scleral icterus.     Extraocular Movements: Extraocular movements intact.      Conjunctiva/sclera: Conjunctivae normal.      Pupils: Pupils are equal, round, and reactive to light.   Neck:      Vascular: No carotid bruit.   Cardiovascular:      Rate and Rhythm: Normal rate and regular rhythm.      Pulses: Normal pulses.      Heart  sounds: No murmur heard.  Pulmonary:      Effort: Pulmonary effort is normal. No respiratory distress.      Breath sounds: Normal breath sounds. No wheezing, rhonchi or rales.   Abdominal:      General: Abdomen is flat. Bowel sounds are normal. There is no distension.      Palpations: Abdomen is soft.      Tenderness: There is no abdominal tenderness.      Hernia: No hernia is present.   Musculoskeletal:         General: No tenderness, deformity or signs of injury. Normal range of motion.      Cervical back: Normal range of motion and neck supple. No tenderness.      Right lower leg: No edema.      Left lower leg: No edema.   Skin:     General: Skin is warm and dry.      Findings: No lesion or rash.   Neurological:      General: No focal deficit present.      Mental Status: She is alert and oriented to person, place, and time. Mental status is at baseline.      Motor: No weakness.   Psychiatric:         Mood and Affect: Mood normal.         Behavior: Behavior normal.         Recent labs before today:     Lab Results   Component Value Date    WBC 4.5 03/18/2022    HGB 13.0 03/18/2022    HCT 40.1 03/18/2022     03/18/2022    CHOL 233 (H) 03/18/2022    TRIG 54 03/18/2022     03/18/2022    ALT 8 03/18/2022    AST 14 03/18/2022     03/18/2022    K 4.3 03/18/2022     03/18/2022    CREATININE 0.63 03/18/2022    BUN 13 03/18/2022    CO2 29 03/18/2022    TSH 2.18 03/18/2022       There are no Patient Instructions on file for this visit.  If you do not hear from me regarding results in 7-10 days either via a call or the portal please call.      Problem List Items Addressed This Visit        Endocrine/Metabolic    Vitamin D deficiency     Will reassess on labs  F/u pending results         Relevant Orders    CBC and differential    Comprehensive metabolic panel    Hemoglobin A1c    Lipid panel    TSH    Vitamin D 25 hydroxy       Hematologic    Heterozygous factor V Leiden mutation (CMS/HCC)     Was  previously recommended to take baby ASA daily  She has not been doing so.   She has no clot history            Other    Screening for colon cancer     Ordered cologuard         Relevant Orders    Scan Only Cologuard (Completed)    CBC and differential    Comprehensive metabolic panel    Hemoglobin A1c    Lipid panel    TSH    Vitamin D 25 hydroxy    Annual physical exam - Primary     Does not want to get COVID or flu vaccine  Please get updated fasting labs  Healthy diet, regular exercise  F/u 1 year annual PE         Relevant Orders    CBC and differential    Comprehensive metabolic panel    Hemoglobin A1c    Lipid panel    TSH    Vitamin D 25 hydroxy    Hyperlipidemia    Relevant Orders    CBC and differential    Comprehensive metabolic panel    Hemoglobin A1c    Lipid panel    TSH    Vitamin D 25 hydroxy            NITA Gil  3/17/2023

## 2023-03-17 NOTE — ASSESSMENT & PLAN NOTE
Was previously recommended to take baby ASA daily  She has not been doing so.   She has no clot history

## 2023-03-24 LAB
25(OH)D3 SERPL-MCNC: 24 NG/ML (ref 30–100)
ALBUMIN SERPL-MCNC: 4.2 G/DL (ref 3.6–5.1)
ALBUMIN/GLOB SERPL: 1.7 (CALC) (ref 1–2.5)
ALP SERPL-CCNC: 39 U/L (ref 31–125)
ALT SERPL-CCNC: 12 U/L (ref 6–29)
AST SERPL-CCNC: 15 U/L (ref 10–35)
BASOPHILS # BLD AUTO: 52 CELLS/UL (ref 0–200)
BASOPHILS NFR BLD AUTO: 1.3 %
BILIRUB SERPL-MCNC: 1 MG/DL (ref 0.2–1.2)
BUN SERPL-MCNC: 16 MG/DL (ref 7–25)
BUN/CREAT SERPL: NORMAL (CALC) (ref 6–22)
CALCIUM SERPL-MCNC: 9.4 MG/DL (ref 8.6–10.2)
CHLORIDE SERPL-SCNC: 103 MMOL/L (ref 98–110)
CHOLEST SERPL-MCNC: 229 MG/DL
CHOLEST/HDLC SERPL: 2.4 (CALC)
CO2 SERPL-SCNC: 31 MMOL/L (ref 20–32)
CREAT SERPL-MCNC: 0.62 MG/DL (ref 0.5–0.99)
EGFRCR SERPLBLD CKD-EPI 2021: 110 ML/MIN/1.73M2
EOSINOPHIL # BLD AUTO: 132 CELLS/UL (ref 15–500)
EOSINOPHIL NFR BLD AUTO: 3.3 %
ERYTHROCYTE [DISTWIDTH] IN BLOOD BY AUTOMATED COUNT: 12.2 % (ref 11–15)
GLOBULIN SER CALC-MCNC: 2.5 G/DL (CALC) (ref 1.9–3.7)
GLUCOSE SERPL-MCNC: 86 MG/DL (ref 65–99)
HBA1C MFR BLD: 5.1 % OF TOTAL HGB
HCT VFR BLD AUTO: 38.6 % (ref 35–45)
HDLC SERPL-MCNC: 95 MG/DL
HGB BLD-MCNC: 12.7 G/DL (ref 11.7–15.5)
LDLC SERPL CALC-MCNC: 120 MG/DL (CALC)
LYMPHOCYTES # BLD AUTO: 1964 CELLS/UL (ref 850–3900)
LYMPHOCYTES NFR BLD AUTO: 49.1 %
MCH RBC QN AUTO: 29.7 PG (ref 27–33)
MCHC RBC AUTO-ENTMCNC: 32.9 G/DL (ref 32–36)
MCV RBC AUTO: 90.2 FL (ref 80–100)
MONOCYTES # BLD AUTO: 272 CELLS/UL (ref 200–950)
MONOCYTES NFR BLD AUTO: 6.8 %
NEUTROPHILS # BLD AUTO: 1580 CELLS/UL (ref 1500–7800)
NEUTROPHILS NFR BLD AUTO: 39.5 %
NONHDLC SERPL-MCNC: 134 MG/DL (CALC)
PLATELET # BLD AUTO: 252 THOUSAND/UL (ref 140–400)
PMV BLD REES-ECKER: 11.2 FL (ref 7.5–12.5)
POTASSIUM SERPL-SCNC: 4.4 MMOL/L (ref 3.5–5.3)
PROT SERPL-MCNC: 6.7 G/DL (ref 6.1–8.1)
RBC # BLD AUTO: 4.28 MILLION/UL (ref 3.8–5.1)
SODIUM SERPL-SCNC: 140 MMOL/L (ref 135–146)
TRIGL SERPL-MCNC: 53 MG/DL
TSH SERPL-ACNC: 3.34 MIU/L
WBC # BLD AUTO: 4 THOUSAND/UL (ref 3.8–10.8)

## 2023-03-31 ENCOUNTER — TRANSCRIBE ORDERS (OUTPATIENT)
Dept: SCHEDULING | Age: 48
End: 2023-03-31

## 2023-03-31 DIAGNOSIS — Z01.419 ENCOUNTER FOR GYNECOLOGICAL EXAMINATION (GENERAL) (ROUTINE) WITHOUT ABNORMAL FINDINGS: Primary | ICD-10-CM

## 2023-04-17 ENCOUNTER — HOSPITAL ENCOUNTER (OUTPATIENT)
Dept: RADIOLOGY | Age: 48
Discharge: HOME | End: 2023-04-17
Attending: OBSTETRICS & GYNECOLOGY
Payer: COMMERCIAL

## 2023-04-17 DIAGNOSIS — Z01.419 ENCOUNTER FOR GYNECOLOGICAL EXAMINATION (GENERAL) (ROUTINE) WITHOUT ABNORMAL FINDINGS: ICD-10-CM

## 2023-04-17 PROCEDURE — 76856 US EXAM PELVIC COMPLETE: CPT

## 2024-03-18 ENCOUNTER — OFFICE VISIT (OUTPATIENT)
Dept: FAMILY MEDICINE | Facility: CLINIC | Age: 49
End: 2024-03-18
Payer: COMMERCIAL

## 2024-03-18 VITALS
TEMPERATURE: 98.3 F | BODY MASS INDEX: 24.64 KG/M2 | WEIGHT: 157 LBS | OXYGEN SATURATION: 98 % | HEART RATE: 95 BPM | RESPIRATION RATE: 18 BRPM | SYSTOLIC BLOOD PRESSURE: 112 MMHG | DIASTOLIC BLOOD PRESSURE: 70 MMHG | HEIGHT: 67 IN

## 2024-03-18 DIAGNOSIS — Z00.00 ANNUAL PHYSICAL EXAM: Primary | ICD-10-CM

## 2024-03-18 DIAGNOSIS — E55.9 VITAMIN D DEFICIENCY: ICD-10-CM

## 2024-03-18 DIAGNOSIS — D68.51 HETEROZYGOUS FACTOR V LEIDEN MUTATION (CMS/HCC): ICD-10-CM

## 2024-03-18 DIAGNOSIS — E78.1 PURE HYPERTRIGLYCERIDEMIA: ICD-10-CM

## 2024-03-18 DIAGNOSIS — Z12.31 ENCOUNTER FOR SCREENING MAMMOGRAM FOR MALIGNANT NEOPLASM OF BREAST: ICD-10-CM

## 2024-03-18 DIAGNOSIS — J06.9 UPPER RESPIRATORY TRACT INFECTION, UNSPECIFIED TYPE: ICD-10-CM

## 2024-03-18 LAB
FLUAV RNA SPEC QL NAA+PROBE: NEGATIVE
FLUBV RNA SPEC QL NAA+PROBE: NEGATIVE
RSV RNA SPEC QL NAA+PROBE: NEGATIVE
SARS-COV-2 RNA RESP QL NAA+PROBE: NEGATIVE

## 2024-03-18 PROCEDURE — 99396 PREV VISIT EST AGE 40-64: CPT | Performed by: NURSE PRACTITIONER

## 2024-03-18 PROCEDURE — 3008F BODY MASS INDEX DOCD: CPT | Performed by: NURSE PRACTITIONER

## 2024-03-18 PROCEDURE — 87637 SARSCOV2&INF A&B&RSV AMP PRB: CPT | Performed by: NURSE PRACTITIONER

## 2024-03-18 RX ORDER — FLUTICASONE PROPIONATE 50 MCG
1 SPRAY, SUSPENSION (ML) NASAL DAILY
Qty: 16 G | Refills: 5 | Status: SHIPPED | OUTPATIENT
Start: 2024-03-18

## 2024-03-18 ASSESSMENT — ENCOUNTER SYMPTOMS
TREMORS: 0
VOICE CHANGE: 0
PALPITATIONS: 0
DIZZINESS: 0
NUMBNESS: 0
DECREASED CONCENTRATION: 0
TROUBLE SWALLOWING: 0
DIARRHEA: 0
NERVOUS/ANXIOUS: 0
ARTHRALGIAS: 0
NAUSEA: 0
FATIGUE: 0
CONFUSION: 0
CONSTIPATION: 0
UNEXPECTED WEIGHT CHANGE: 0
SLEEP DISTURBANCE: 0
BRUISES/BLEEDS EASILY: 0
ABDOMINAL PAIN: 0
SPEECH DIFFICULTY: 0
LIGHT-HEADEDNESS: 0
WOUND: 0
MYALGIAS: 0
WEAKNESS: 0
HEADACHES: 0
DIFFICULTY URINATING: 0
ABDOMINAL DISTENTION: 0
EYE PAIN: 0
BLOOD IN STOOL: 0
CHEST TIGHTNESS: 0
COUGH: 1
SHORTNESS OF BREATH: 0
FREQUENCY: 0
CHILLS: 0
VOMITING: 0
WHEEZING: 0
FEVER: 0

## 2024-03-18 ASSESSMENT — PATIENT HEALTH QUESTIONNAIRE - PHQ9: SUM OF ALL RESPONSES TO PHQ9 QUESTIONS 1 & 2: 0

## 2024-03-18 NOTE — PROGRESS NOTES
Reason for visit:   Chief Complaint   Patient presents with   • Annual Exam       HPI  is a 48 y.o. female     HPI    Here for annual PE.     . Has son graduating from , and 15 yo daughter.     Yesterday started with some URI sx- nasal congestion, mild cough. No f/c. Home COVID test negative.     Due for mammo.     UTD PAP     Has never had CRC screening, thought she wanted to do cologuard but now wants to do colonoscopy.     Due for dental.     Due for optho- notes vision change.     UTD derm skin checks     No formal exercise. Eats fairly balanced diet.     Problem List:  Patient Active Problem List    Diagnosis Date Noted   • Upper respiratory tract infection 2024   • Screening for colon cancer 2023   • Annual physical exam 2023   • Hyperlipidemia 2023   • Heterozygous factor V Leiden mutation (CMS/[facility]) 2020   • Primary insomnia 2020   • MICHELLE (generalized anxiety disorder) 2020   • Venous insufficiency of both lower extremities 2020   • Vitamin D deficiency 2019       Surgical History:  Past Surgical History:   Procedure Laterality Date   •  SECTION         Social History:  Social History     Social History Narrative   • Not on file       Family History:  Family History   Problem Relation Age of Onset   • Hypertension Biological Mother    • Hypertension Biological Father    • Heart disease Biological Father    • Diabetes Biological Father    • Ovarian cancer Biological Sister    • Ovarian cancer Mother's Sister        Allergies:  No known drug allergies    Current Medications:  Current Outpatient Medications   Medication Sig Dispense Refill   • fluticasone propionate (FLONASE) 50 mcg/actuation nasal spray Administer 1 spray into each nostril daily. 16 g 5   • cholecalciferol, vitamin D3, 100 mcg (4,000 unit) capsule Take 1 capsule daily w/ largest meal of the day.       No current facility-administered medications for this visit.          Review of Systems:  Review of Systems   Constitutional: Negative for chills, fatigue, fever and unexpected weight change.   HENT: Positive for congestion and postnasal drip. Negative for ear pain, hearing loss, trouble swallowing and voice change.    Eyes: Negative for pain and visual disturbance.   Respiratory: Positive for cough. Negative for chest tightness, shortness of breath and wheezing.    Cardiovascular: Negative for chest pain, palpitations and leg swelling.   Gastrointestinal: Negative for abdominal distention, abdominal pain, blood in stool, constipation, diarrhea, nausea and vomiting.   Genitourinary: Negative for difficulty urinating, frequency and urgency.   Musculoskeletal: Negative for arthralgias, gait problem and myalgias.   Skin: Negative for rash and wound.   Neurological: Negative for dizziness, tremors, speech difficulty, weakness, light-headedness, numbness and headaches.   Hematological: Does not bruise/bleed easily.   Psychiatric/Behavioral: Negative for behavioral problems, confusion, decreased concentration, sleep disturbance and suicidal ideas. The patient is not nervous/anxious.        Objective     Vital Signs:  Vitals:    03/18/24 0901   BP: 112/70   Pulse: 95   Resp: 18   Temp: 36.8 °C (98.3 °F)   SpO2: 98%       BMI:  Body mass index is 24.59 kg/m².     Physical Exam  Constitutional:       Appearance: Normal appearance. She is normal weight.   HENT:      Head: Normocephalic.      Right Ear: Tympanic membrane, ear canal and external ear normal.      Left Ear: Tympanic membrane, ear canal and external ear normal.      Nose: Congestion present.      Mouth/Throat:      Mouth: Mucous membranes are moist.      Pharynx: Oropharynx is clear.   Eyes:      General: No scleral icterus.     Extraocular Movements: Extraocular movements intact.      Conjunctiva/sclera: Conjunctivae normal.      Pupils: Pupils are equal, round, and reactive to light.   Neck:      Vascular: No carotid  bruit.   Cardiovascular:      Rate and Rhythm: Normal rate and regular rhythm.      Pulses: Normal pulses.      Heart sounds: No murmur heard.  Pulmonary:      Effort: Pulmonary effort is normal. No respiratory distress.      Breath sounds: Normal breath sounds. No wheezing, rhonchi or rales.   Abdominal:      General: Abdomen is flat. Bowel sounds are normal. There is no distension.      Palpations: Abdomen is soft.      Tenderness: There is no abdominal tenderness.      Hernia: No hernia is present.   Musculoskeletal:         General: No tenderness, deformity or signs of injury. Normal range of motion.      Cervical back: Normal range of motion and neck supple. No tenderness.      Right lower leg: No edema.      Left lower leg: No edema.   Skin:     General: Skin is warm and dry.      Findings: No lesion or rash.   Neurological:      General: No focal deficit present.      Mental Status: She is alert and oriented to person, place, and time. Mental status is at baseline.      Motor: No weakness.   Psychiatric:         Mood and Affect: Mood normal.         Behavior: Behavior normal.         Recent labs before today:     Lab Results   Component Value Date    WBC 4.0 03/23/2023    HGB 12.7 03/23/2023    HCT 38.6 03/23/2023     03/23/2023    CHOL 229 (H) 03/23/2023    TRIG 53 03/23/2023    HDL 95 03/23/2023    ALT 12 03/23/2023    AST 15 03/23/2023     03/23/2023    K 4.4 03/23/2023     03/23/2023    CREATININE 0.62 03/23/2023    BUN 16 03/23/2023    CO2 31 03/23/2023    TSH 3.34 03/23/2023    HGBA1C 5.1 03/23/2023       There are no Patient Instructions on file for this visit.  If you do not hear from me regarding results in 7-10 days either via a call or the portal please call.      Problem List Items Addressed This Visit        Endocrine/Metabolic    Vitamin D deficiency    Relevant Orders    CBC and differential    Comprehensive metabolic panel    Hemoglobin A1c    Lipid panel    TSH w reflex FT4     Vitamin D 25 hydroxy       Hematologic    Heterozygous factor V Leiden mutation (CMS/HCC)       Infectious/Inflammatory    Upper respiratory tract infection     Covid/flu/rsv swab obtained  For now recommend continued supportive care- rest, fluids, mucinex, consider flonase  F/u if no better/worse         Relevant Orders    COVID-19, FLU A+B AND RSV [facility]       Other    Annual physical exam - Primary     Please get updated fasting labs  mammo slip issued  She plans to schedule with her 's GI for colonoscopy   rec dental and optho exam  F/u 1 year annual PE         Relevant Orders    CBC and differential    Comprehensive metabolic panel    Hemoglobin A1c    Lipid panel    TSH w reflex FT4    Vitamin D 25 hydroxy    Hyperlipidemia    Relevant Orders    CBC and differential    Comprehensive metabolic panel    Hemoglobin A1c    Lipid panel    TSH w reflex FT4    Vitamin D 25 hydroxy   Other Visit Diagnoses     Encounter for screening mammogram for malignant neoplasm of breast        Relevant Orders    BI SCREENING MAMMOGRAM BILATERAL(TOMOSYNTHESIS)    CBC and differential    Comprehensive metabolic panel    Hemoglobin A1c    Lipid panel    TSH w reflex FT4    Vitamin D 25 hydroxy               NITA Gil  3/18/2024

## 2024-03-18 NOTE — ASSESSMENT & PLAN NOTE
Covid/flu/rsv swab obtained  For now recommend continued supportive care- rest, fluids, mucinex, consider flonase  F/u if no better/worse

## 2024-03-18 NOTE — ASSESSMENT & PLAN NOTE
Please get updated fasting labs  mammo slip issued  She plans to schedule with her 's GI for colonoscopy   rec dental and optho exam  F/u 1 year annual PE

## 2024-04-08 ENCOUNTER — TRANSCRIBE ORDERS (OUTPATIENT)
Dept: SCHEDULING | Age: 49
End: 2024-04-08

## 2024-04-08 DIAGNOSIS — N83.9 NONINFLAMMATORY DISORDER OF OVARY, FALLOPIAN TUBE AND BROAD LIGAMENT, UNSPECIFIED: ICD-10-CM

## 2024-04-08 DIAGNOSIS — R10.2 PELVIC AND PERINEAL PAIN: Primary | ICD-10-CM

## 2024-04-09 LAB
25(OH)D3+25(OH)D2 SERPL-MCNC: 24 NG/ML (ref 30–100)
ALBUMIN SERPL-MCNC: 4.3 G/DL (ref 3.6–5.1)
ALBUMIN/GLOB SERPL: 1.7 (CALC) (ref 1–2.5)
ALP SERPL-CCNC: 43 U/L (ref 31–125)
ALT SERPL-CCNC: 13 U/L (ref 6–29)
AST SERPL-CCNC: 16 U/L (ref 10–35)
BASOPHILS # BLD AUTO: 42 CELLS/UL (ref 0–200)
BASOPHILS NFR BLD AUTO: 0.8 %
BILIRUB SERPL-MCNC: 0.9 MG/DL (ref 0.2–1.2)
BUN SERPL-MCNC: 13 MG/DL (ref 7–25)
BUN/CREAT SERPL: NORMAL (CALC) (ref 6–22)
CALCIUM SERPL-MCNC: 9.2 MG/DL (ref 8.6–10.2)
CHLORIDE SERPL-SCNC: 102 MMOL/L (ref 98–110)
CHOLEST SERPL-MCNC: 232 MG/DL
CHOLEST/HDLC SERPL: 2.3 (CALC)
CO2 SERPL-SCNC: 30 MMOL/L (ref 20–32)
CREAT SERPL-MCNC: 0.54 MG/DL (ref 0.5–0.99)
EGFRCR SERPLBLD CKD-EPI 2021: 113 ML/MIN/1.73M2
EOSINOPHIL # BLD AUTO: 151 CELLS/UL (ref 15–500)
EOSINOPHIL NFR BLD AUTO: 2.9 %
ERYTHROCYTE [DISTWIDTH] IN BLOOD BY AUTOMATED COUNT: 12.5 % (ref 11–15)
GLOBULIN SER CALC-MCNC: 2.5 G/DL (CALC) (ref 1.9–3.7)
GLUCOSE SERPL-MCNC: 95 MG/DL (ref 65–99)
HBA1C MFR BLD: 5.4 % OF TOTAL HGB
HCT VFR BLD AUTO: 39.3 % (ref 35–45)
HDLC SERPL-MCNC: 99 MG/DL
HGB BLD-MCNC: 13.1 G/DL (ref 11.7–15.5)
LDLC SERPL CALC-MCNC: 117 MG/DL (CALC)
LYMPHOCYTES # BLD AUTO: 1544 CELLS/UL (ref 850–3900)
LYMPHOCYTES NFR BLD AUTO: 29.7 %
MCH RBC QN AUTO: 30 PG (ref 27–33)
MCHC RBC AUTO-ENTMCNC: 33.3 G/DL (ref 32–36)
MCV RBC AUTO: 90.1 FL (ref 80–100)
MONOCYTES # BLD AUTO: 322 CELLS/UL (ref 200–950)
MONOCYTES NFR BLD AUTO: 6.2 %
NEUTROPHILS # BLD AUTO: 3141 CELLS/UL (ref 1500–7800)
NEUTROPHILS NFR BLD AUTO: 60.4 %
NONHDLC SERPL-MCNC: 133 MG/DL (CALC)
PLATELET # BLD AUTO: 219 THOUSAND/UL (ref 140–400)
PMV BLD REES-ECKER: 11.2 FL (ref 7.5–12.5)
POTASSIUM SERPL-SCNC: 4.2 MMOL/L (ref 3.5–5.3)
PROT SERPL-MCNC: 6.8 G/DL (ref 6.1–8.1)
RBC # BLD AUTO: 4.36 MILLION/UL (ref 3.8–5.1)
SODIUM SERPL-SCNC: 139 MMOL/L (ref 135–146)
TRIGL SERPL-MCNC: 66 MG/DL
TSH SERPL-ACNC: 2.35 MIU/L
WBC # BLD AUTO: 5.2 THOUSAND/UL (ref 3.8–10.8)

## 2024-04-10 ENCOUNTER — HOSPITAL ENCOUNTER (OUTPATIENT)
Dept: RADIOLOGY | Age: 49
Discharge: HOME | End: 2024-04-10
Attending: OBSTETRICS & GYNECOLOGY
Payer: COMMERCIAL

## 2024-04-10 DIAGNOSIS — R10.2 PELVIC AND PERINEAL PAIN: ICD-10-CM

## 2024-04-10 PROCEDURE — 76856 US EXAM PELVIC COMPLETE: CPT | Mod: 59

## 2024-04-29 ENCOUNTER — TRANSCRIBE ORDERS (OUTPATIENT)
Dept: SCHEDULING | Age: 49
End: 2024-04-29

## 2024-04-29 DIAGNOSIS — N83.9 NONINFLAMMATORY DISORDER OF OVARY, FALLOPIAN TUBE AND BROAD LIGAMENT, UNSPECIFIED: Primary | ICD-10-CM

## 2024-05-01 ENCOUNTER — HOSPITAL ENCOUNTER (OUTPATIENT)
Dept: RADIOLOGY | Age: 49
Discharge: HOME | End: 2024-05-01
Attending: OBSTETRICS & GYNECOLOGY
Payer: COMMERCIAL

## 2024-05-01 DIAGNOSIS — N83.9 NONINFLAMMATORY DISORDER OF OVARY, FALLOPIAN TUBE AND BROAD LIGAMENT, UNSPECIFIED: ICD-10-CM

## 2024-05-01 PROCEDURE — 76830 TRANSVAGINAL US NON-OB: CPT

## 2024-05-01 PROCEDURE — 76856 US EXAM PELVIC COMPLETE: CPT

## 2024-06-07 ENCOUNTER — HOSPITAL ENCOUNTER (OUTPATIENT)
Dept: RADIOLOGY | Age: 49
Discharge: HOME | End: 2024-06-07
Attending: OBSTETRICS & GYNECOLOGY
Payer: COMMERCIAL

## 2024-06-07 DIAGNOSIS — N83.9 NONINFLAMMATORY DISORDER OF OVARY, FALLOPIAN TUBE AND BROAD LIGAMENT, UNSPECIFIED: ICD-10-CM

## 2024-06-07 RX ORDER — GADOBUTROL 604.72 MG/ML
7 INJECTION INTRAVENOUS ONCE
Status: COMPLETED | OUTPATIENT
Start: 2024-06-07 | End: 2024-06-07

## 2024-06-07 RX ADMIN — GADOBUTROL 7 ML: 604.72 INJECTION INTRAVENOUS at 10:10

## 2024-10-04 ENCOUNTER — TRANSCRIBE ORDERS (OUTPATIENT)
Dept: SCHEDULING | Age: 49
End: 2024-10-04

## 2024-10-04 DIAGNOSIS — N83.201 UNSPECIFIED OVARIAN CYST, RIGHT SIDE: Primary | ICD-10-CM

## 2024-10-11 ENCOUNTER — HOSPITAL ENCOUNTER (OUTPATIENT)
Dept: RADIOLOGY | Age: 49
Discharge: HOME | End: 2024-10-11
Attending: OBSTETRICS & GYNECOLOGY
Payer: COMMERCIAL

## 2024-10-11 DIAGNOSIS — N83.201 UNSPECIFIED OVARIAN CYST, RIGHT SIDE: ICD-10-CM

## 2024-10-11 PROCEDURE — 76856 US EXAM PELVIC COMPLETE: CPT | Mod: 59

## 2025-04-18 ENCOUNTER — TRANSCRIBE ORDERS (OUTPATIENT)
Dept: SCHEDULING | Age: 50
End: 2025-04-18

## 2025-04-18 DIAGNOSIS — N83.9 NONINFLAMMATORY DISORDER OF OVARY: Primary | ICD-10-CM

## 2025-04-23 ENCOUNTER — HOSPITAL ENCOUNTER (OUTPATIENT)
Dept: RADIOLOGY | Age: 50
Discharge: HOME | End: 2025-04-23
Attending: OBSTETRICS & GYNECOLOGY
Payer: COMMERCIAL

## 2025-04-23 DIAGNOSIS — N83.9 NONINFLAMMATORY DISORDER OF OVARY: ICD-10-CM

## 2025-04-23 PROCEDURE — 76856 US EXAM PELVIC COMPLETE: CPT | Mod: 59
